# Patient Record
Sex: FEMALE | Race: WHITE | NOT HISPANIC OR LATINO | Employment: UNEMPLOYED | URBAN - NONMETROPOLITAN AREA
[De-identification: names, ages, dates, MRNs, and addresses within clinical notes are randomized per-mention and may not be internally consistent; named-entity substitution may affect disease eponyms.]

---

## 2017-04-01 ENCOUNTER — APPOINTMENT (OUTPATIENT)
Dept: GENERAL RADIOLOGY | Facility: HOSPITAL | Age: 27
End: 2017-04-01

## 2017-04-01 ENCOUNTER — APPOINTMENT (OUTPATIENT)
Dept: CT IMAGING | Facility: HOSPITAL | Age: 27
End: 2017-04-01

## 2017-04-01 ENCOUNTER — HOSPITAL ENCOUNTER (EMERGENCY)
Facility: HOSPITAL | Age: 27
Discharge: HOME | End: 2017-04-01
Attending: EMERGENCY MEDICINE | Admitting: EMERGENCY MEDICINE

## 2017-04-01 VITALS
BODY MASS INDEX: 45.99 KG/M2 | WEIGHT: 293 LBS | HEIGHT: 67 IN | DIASTOLIC BLOOD PRESSURE: 84 MMHG | HEART RATE: 88 BPM | SYSTOLIC BLOOD PRESSURE: 121 MMHG | OXYGEN SATURATION: 97 % | RESPIRATION RATE: 16 BRPM | TEMPERATURE: 98.4 F

## 2017-04-01 DIAGNOSIS — D72.829 LEUKOCYTOSIS, UNSPECIFIED TYPE: ICD-10-CM

## 2017-04-01 DIAGNOSIS — S40.012A CONTUSION OF LEFT SHOULDER, INITIAL ENCOUNTER: ICD-10-CM

## 2017-04-01 DIAGNOSIS — S06.0X9A CONCUSSION, WITH LOSS OF CONSCIOUSNESS OF UNSPECIFIED DURATION, INITIAL ENCOUNTER: ICD-10-CM
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
## 2017-04-01 LAB
ALBUMIN SERPL-MCNC: 3.7 G/DL (ref 3.4–4.8)
ALBUMIN/GLOB SERPL: 1.2 G/DL (ref 1.1–1.8)
ALP SERPL-CCNC: 63 U/L (ref 38–126)
ALT SERPL W P-5'-P-CCNC: 24 U/L (ref 9–52)
AMYLASE SERPL-CCNC: 57 U/L (ref 50–130)
ANION GAP SERPL CALCULATED.3IONS-SCNC: 11 MMOL/L (ref 5–15)
AST SERPL-CCNC: 21 U/L (ref 14–36)
BASOPHILS # BLD AUTO: 0.03 10*3/MM3 (ref 0–0.2)
BASOPHILS NFR BLD AUTO: 0.2 % (ref 0–2)
BILIRUB SERPL-MCNC: 0.5 MG/DL (ref 0.2–1.3)
BILIRUB UR QL STRIP: NEGATIVE
BUN BLD-MCNC: 7 MG/DL (ref 7–21)
BUN/CREAT SERPL: 9.7 (ref 7–25)
CALCIUM SPEC-SCNC: 8.8 MG/DL (ref 8.4–10.2)
CHLORIDE SERPL-SCNC: 101 MMOL/L (ref 95–110)
CLARITY UR: CLEAR
CO2 SERPL-SCNC: 26 MMOL/L (ref 22–31)
COLOR UR: YELLOW
CREAT BLD-MCNC: 0.72 MG/DL (ref 0.5–1)
DEPRECATED RDW RBC AUTO: 44.2 FL (ref 36.4–46.3)
EOSINOPHIL # BLD AUTO: 0.48 10*3/MM3 (ref 0–0.7)
EOSINOPHIL NFR BLD AUTO: 2.9 % (ref 0–7)
ERYTHROCYTE [DISTWIDTH] IN BLOOD BY AUTOMATED COUNT: 13.5 % (ref 11.5–14.5)
GFR SERPL CREATININE-BSD FRML MDRD: 96 ML/MIN/1.73 (ref 60–165)
GLOBULIN UR ELPH-MCNC: 3.2 GM/DL (ref 2.3–3.5)
GLUCOSE BLD-MCNC: 123 MG/DL (ref 60–100)
GLUCOSE UR STRIP-MCNC: NEGATIVE MG/DL
HCG SERPL QL: NEGATIVE
HCT VFR BLD AUTO: 39.6 % (ref 35–45)
HGB BLD-MCNC: 14.7 G/DL (ref 12–15.5)
HGB UR QL STRIP.AUTO: NEGATIVE
IMM GRANULOCYTES # BLD: 0.07 10*3/MM3 (ref 0–0.02)
IMM GRANULOCYTES NFR BLD: 0.4 % (ref 0–0.5)
KETONES UR QL STRIP: NEGATIVE
LEUKOCYTE ESTERASE UR QL STRIP.AUTO: NEGATIVE
LIPASE SERPL-CCNC: 42 U/L (ref 23–300)
LYMPHOCYTES # BLD AUTO: 3.77 10*3/MM3 (ref 0.6–4.2)
LYMPHOCYTES NFR BLD AUTO: 22.9 % (ref 10–50)
MCH RBC QN AUTO: 33.5 PG (ref 26.5–34)
MCHC RBC AUTO-ENTMCNC: ABNORMAL G/DL (ref 31.4–36)
MCV RBC AUTO: 90 FL (ref 80–98)
MONOCYTES # BLD AUTO: 0.76 10*3/MM3 (ref 0–0.9)
MONOCYTES NFR BLD AUTO: 4.6 % (ref 0–12)
NEUTROPHILS # BLD AUTO: 11.32 10*3/MM3 (ref 2–8.6)
NEUTROPHILS NFR BLD AUTO: 69 % (ref 37–80)
NITRITE UR QL STRIP: NEGATIVE
NRBC BLD MANUAL-RTO: 0 /100 WBC (ref 0–0)
PH UR STRIP.AUTO: 6.5 [PH] (ref 5–9)
PLATELET # BLD AUTO: 287 10*3/MM3 (ref 150–450)
PMV BLD AUTO: 8.7 FL (ref 8–12)
POTASSIUM BLD-SCNC: 3.8 MMOL/L (ref 3.5–5.1)
PROT SERPL-MCNC: 6.9 G/DL (ref 6.3–8.6)
PROT UR QL STRIP: NEGATIVE
RBC # BLD AUTO: 4.4 10*6/MM3 (ref 3.77–5.16)
SODIUM BLD-SCNC: 138 MMOL/L (ref 137–145)
SP GR UR STRIP: 1.01 (ref 1–1.03)
UROBILINOGEN UR QL STRIP: NORMAL
WBC NRBC COR # BLD: 16.43 10*3/MM3 (ref 3.2–9.8)
WHOLE BLOOD HOLD SPECIMEN: NORMAL

## 2017-04-01 PROCEDURE — 85025 COMPLETE CBC W/AUTO DIFF WBC: CPT | Performed by: EMERGENCY MEDICINE

## 2017-04-01 PROCEDURE — 73090 X-RAY EXAM OF FOREARM: CPT

## 2017-04-01 PROCEDURE — 84703 CHORIONIC GONADOTROPIN ASSAY: CPT | Performed by: EMERGENCY MEDICINE

## 2017-04-01 PROCEDURE — 82150 ASSAY OF AMYLASE: CPT | Performed by: EMERGENCY MEDICINE

## 2017-04-01 PROCEDURE — 25010000002 KETOROLAC TROMETHAMINE PER 15 MG: Performed by: EMERGENCY MEDICINE

## 2017-04-01 PROCEDURE — 83690 ASSAY OF LIPASE: CPT | Performed by: EMERGENCY MEDICINE

## 2017-04-01 PROCEDURE — 81003 URINALYSIS AUTO W/O SCOPE: CPT | Performed by: EMERGENCY MEDICINE

## 2017-04-01 PROCEDURE — 80053 COMPREHEN METABOLIC PANEL: CPT | Performed by: EMERGENCY MEDICINE

## 2017-04-01 PROCEDURE — 96372 THER/PROPH/DIAG INJ SC/IM: CPT

## 2017-04-01 PROCEDURE — 73030 X-RAY EXAM OF SHOULDER: CPT

## 2017-04-01 PROCEDURE — 99284 EMERGENCY DEPT VISIT MOD MDM: CPT

## 2017-04-01 PROCEDURE — 70450 CT HEAD/BRAIN W/O DYE: CPT

## 2017-04-01 PROCEDURE — 71020 HC CHEST PA AND LATERAL: CPT

## 2017-04-01 PROCEDURE — 72125 CT NECK SPINE W/O DYE: CPT

## 2017-04-01 RX ORDER — KETOROLAC TROMETHAMINE 30 MG/ML
60 INJECTION, SOLUTION INTRAMUSCULAR; INTRAVENOUS ONCE
Status: COMPLETED | OUTPATIENT
Start: 2017-04-01 | End: 2017-04-01

## 2017-04-01 RX ORDER — HYDROCODONE BITARTRATE AND ACETAMINOPHEN 7.5; 325 MG/1; MG/1
1 TABLET ORAL ONCE
Status: COMPLETED | OUTPATIENT
Start: 2017-04-01 | End: 2017-04-01

## 2017-04-01 RX ORDER — SODIUM CHLORIDE 0.9 % (FLUSH) 0.9 %
10 SYRINGE (ML) INJECTION AS NEEDED
Status: DISCONTINUED | OUTPATIENT
Start: 2017-04-01 | End: 2017-04-01 | Stop reason: HOSPADM

## 2017-04-01 RX ADMIN — HYDROCODONE BITARTRATE AND ACETAMINOPHEN 1 TABLET: 7.5; 325 TABLET ORAL at 15:45

## 2017-04-01 RX ADMIN — KETOROLAC TROMETHAMINE 60 MG: 60 INJECTION, SOLUTION INTRAMUSCULAR at 19:02
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
## 2017-04-01 NOTE — DISCHARGE INSTRUCTIONS
Return ED fever, chest pain, shortness of air, abdominal pain, vomiting, worse condition, other concerns

## 2017-04-05 ENCOUNTER — OFFICE VISIT (OUTPATIENT)
Dept: FAMILY MEDICINE CLINIC | Facility: CLINIC | Age: 27
End: 2017-04-05

## 2017-04-05 VITALS
HEIGHT: 68 IN | SYSTOLIC BLOOD PRESSURE: 110 MMHG | WEIGHT: 293 LBS | BODY MASS INDEX: 44.41 KG/M2 | DIASTOLIC BLOOD PRESSURE: 72 MMHG

## 2017-04-05 DIAGNOSIS — M75.82 ROTATOR CUFF TENDONITIS, LEFT: ICD-10-CM

## 2017-04-05 DIAGNOSIS — R41.0 CONFUSION: ICD-10-CM

## 2017-04-05 DIAGNOSIS — R51.9 HEADACHE, UNSPECIFIED HEADACHE TYPE: Primary | ICD-10-CM

## 2017-04-05 PROCEDURE — 99213 OFFICE O/P EST LOW 20 MIN: CPT | Performed by: FAMILY MEDICINE

## 2017-04-05 RX ORDER — LORAZEPAM 1 MG/1
1 TABLET ORAL ONCE
Qty: 1 TABLET | Refills: 0 | Status: SHIPPED | OUTPATIENT
Start: 2017-04-05 | End: 2017-04-05

## 2017-04-05 RX ORDER — DICLOFENAC POTASSIUM 50 MG/1
50 TABLET, FILM COATED ORAL 3 TIMES DAILY
Qty: 60 TABLET | Refills: 2 | Status: SHIPPED | OUTPATIENT
Start: 2017-04-05 | End: 2017-11-01

## 2017-04-05 RX ORDER — DESOGESTREL AND ETHINYL ESTRADIOL 0.15-0.03
KIT ORAL
Refills: 2 | COMMUNITY
Start: 2017-02-27 | End: 2017-04-05

## 2017-04-05 NOTE — PATIENT INSTRUCTIONS
Post-Concussion Syndrome  Post-concussion syndrome describes the symptoms that can occur after a head injury. These symptoms can last from weeks to months.  CAUSES   It is not clear why some head injuries cause post-concussion syndrome. It can occur whether your head injury was mild or severe and whether you were wearing head protection or not.   SIGNS AND SYMPTOMS  · Memory difficulties.  · Dizziness.  · Headaches.  · Double vision or blurry vision.  · Sensitivity to light.  · Hearing difficulties.  · Depression.  · Tiredness.  · Weakness.  · Difficulty with concentration.  · Difficulty sleeping or staying asleep.  · Vomiting.  · Poor balance or instability on your feet.  · Slow reaction time.  · Difficulty learning and remembering things you have heard.  DIAGNOSIS   There is no test to determine whether you have post-concussion syndrome. Your health care provider may order an imaging scan of your brain, such as a CT scan, to check for other problems that may be causing your symptoms (such as a severe injury inside your skull).  TREATMENT   Usually, these problems disappear over time without medical care. Your health care provider may prescribe medicine to help ease your symptoms. It is important to follow up with a neurologist to evaluate your recovery and address any lingering symptoms or issues.  HOME CARE INSTRUCTIONS   · Take medicines only as directed by your health care provider. Do not take aspirin. Aspirin can slow blood clotting.  · Sleep with your head slightly elevated to help with headaches.  · Avoid any situation where there is potential for another head injury. This includes football, hockey, soccer, basketball, martial arts, downhill snow sports, and horseback riding. Your condition will get worse every time you experience a concussion. You should avoid these activities until you are evaluated by the appropriate follow-up health care providers.  · Keep all follow-up visits as directed by your health  care provider. This is important.  SEEK MEDICAL CARE IF:  · You have increased problems paying attention or concentrating.  · You have increased difficulty remembering or learning new information.  · You need more time to complete tasks or assignments than before.  · You have increased irritability or decreased ability to cope with stress.  · You have more symptoms than before.  Seek medical care if you have any of the following symptoms for more than two weeks after your injury:  · Lasting (chronic) headaches.  · Dizziness or balance problems.  · Nausea.  · Vision problems.  · Increased sensitivity to noise or light.  · Depression or mood swings.  · Anxiety or irritability.  · Memory problems.  · Difficulty concentrating or paying attention.  · Sleep problems.  · Feeling tired all the time.  SEEK IMMEDIATE MEDICAL CARE IF:  · You have confusion or unusual drowsiness.  · Others find it difficult to wake you up.  · You have nausea or persistent, forceful vomiting.  · You feel like you are moving when you are not (vertigo). Your eyes may move rapidly back and forth.  · You have convulsions or faint.  · You have severe, persistent headaches that are not relieved by medicine.  · You cannot use your arms or legs normally.  · One of your pupils is larger than the other.  · You have clear or bloody discharge from your nose or ears.  · Your problems are getting worse, not better.  MAKE SURE YOU:  · Understand these instructions.  · Will watch your condition.  · Will get help right away if you are not doing well or get worse.     This information is not intended to replace advice given to you by your health care provider. Make sure you discuss any questions you have with your health care provider.     Document Released: 06/09/2003 Document Revised: 01/08/2016 Document Reviewed: 03/25/2015  [employer] Patient Education ©2016 [employer]    Surgery for Rotator Cuff Tear With Rehab  Rotator cuff surgery is only  recommended for individuals who have experienced persistent disability for greater than 3 months of non-surgical (conservative) treatment. Surgery is not necessary but is recommended for individuals who experience difficulty completing daily activities or athletes who are unable to compete. Rotator cuff tears do not usually heal without surgical intervention. If left alone small rotator cuff tears usually become larger. Younger athletes who have a rotator cuff tear may be recommended for surgery without attempting conservative rehabilitation. The purpose of surgery is to regain function of the shoulder joint and eliminate pain associated with the injury. In addition to repairing the tendon tear, the surgery often removes a portion of the bony roof of the shoulder (acromion) as well as the chronically thickened and inflamed membrane below the acromion (subacromial bursa).  REASONS NOT TO OPERATE   · Infection of the shoulder.  · Inability to complete a rehabilitation program.  · Patients who have other conditions (emotional or psychological) conditions that contribute to their shoulder condition.  RISKS AND COMPLICATIONS  · Infection.  · Re-tear of the rotator cuff tendons or muscles.  · Shoulder stiffness and/or weakness.  · Inability to compete in athletics.  · Acromioclavicular (AC) joint paint.  · Risks of surgery: infection, bleeding, nerve damage, or damage to surrounding tissues.  TECHNIQUE  There are different surgical procedures used to treat rotator cuff tears. The type of procedure depends on the extent of injury as well as the surgeon's preference. All of the surgical techniques for rotator cuff tears have the same goal of repairing the torn tendon, removing part of the acromion, and removing the subacromial bursa. There are two main types of procedures: arthroscopic and open incision.  Arthroscopic procedures are usually completed and you go home the same day as surgery (outpatient). These procedures use  multiple small incisions in which tools and a video camera are placed to work on the shoulder. An electric shaver removes the bursa, then a power kana shaves down the portion of the acromion that places pressure on the rotator cuff. Finally the rotator cuff is sewed (sutured) back to the humeral head.  Open incision procedures require a larger incision. The deltoid muscle is detached from the acromion and a ligament in the shoulder (coracoacromial) is cut in order for the surgeon to access the rotator cuff. The subacromial bursa is removed as well as part of the acromion to give the rotator cuff room to move freely. The torn tendon is then sutured to the humeral head. After the rotator cuff is repaired, then the deltoid is reattached and the incision is closed up.   RECOVERY   · Post-operative care depends on the surgical technique and the preferences of your therapist.  · Keep the wound clean and dry for the first 10 to 14 days after surgery.  · Keep your shoulder and arm in the sling provided to you for as long as you have been instructed to.  · You will be given pain medications by your caregiver.  · Passive (without using muscles) shoulder movements may be begun immediately after surgery.  · It is important to follow through with you rehabilitation program in order to have the best possible recovery.  RETURN TO SPORTS   · The rehabilitation period will depend on the sport and position you play as well as the success of the operation.  · The minimum recovery period is 6 months.  · You must have regained complete shoulder motion and strength before returning to sports.  SEEK IMMEDIATE MEDICAL CARE IF:   · Any medications produce adverse side effects.  · Any complications from surgery occur:    Pain, numbness, or coldness in the extremity operated upon.    Discoloration of the nail beds (they become blue or gray) of the extremity operated upon.    Signs of infections (fever, pain, inflammation, redness, or  persistent bleeding).  EXERCISES   RANGE OF MOTION (ROM) AND STRETCHING EXERCISES - Rotator Cuff Tear, Surgery For  These exercises may help you restore your elbow mobility once your physician has discontinued your immobilization period. Beginning these before your provider's approval may result in delayed healing. Your symptoms may resolve with or without further involvement from your physician, physical therapist or . While completing these exercises, remember:   · Restoring tissue flexibility helps normal motion to return to the joints. This allows healthier, less painful movement and activity.  · An effective stretch should be held for at least 30 seconds. A stretch should never be painful. You should only feel a gentle lengthening or release in the stretch.  ROM - Pendulum   · Bend at the waist so that your right / left arm falls away from your body. Support yourself with your opposite hand on a solid surface, such as a table or a countertop.  · Your right / left arm should be perpendicular to the ground. If it is not perpendicular, you need to lean over farther. Relax the muscles in your right / left arm and shoulder as much as possible.  · Gently sway your hips and trunk so they move your right / left arm without any use of your right / left shoulder muscles.  · Progress your movements so that your right / left arm moves side to side, then forward and backward, and finally, both clockwise and counterclockwise.  · Complete __________ repetitions in each direction. Many people use this exercise to relieve discomfort in their shoulder as well as to gain range of motion.  Repeat __________ times. Complete this exercise __________ times per day.  STRETCH - Flexion, Seated   · Sit in a firm chair so that your right / left forearm can rest on a table or on a table or countertop. Your right / left elbow should rest below the height of your shoulder so that your shoulder feels supported and not tense  or uncomfortable.  · Keeping your right / left shoulder relaxed, lean forward at your waist, allowing your right / left hand to slide forward. Bend forward until you feel a moderate stretch in your shoulder, but before you feel an increase in your pain.  · Hold __________ seconds. Slowly return to your starting position.  Repeat __________ times. Complete this exercise __________ times per day.   STRETCH - Flexion, Standing   · Stand with good posture. With an underhand  on your right / left and an overhand  on the opposite hand, grasp a broomstick or cane so that your hands are a little more than shoulder-width apart.  · Keeping your right / left elbow straight and shoulder muscles relaxed, push the stick with your opposite hand to raise your right / left arm in front of your body and then overhead. Raise your arm until you feel a stretch in your right / left shoulder, but before you have increased shoulder pain.  · Try to avoid shrugging your right / left shoulder as your arm rises by keeping your shoulder blade tucked down and toward your mid-back spine. Hold __________ seconds.  · Slowly return to the starting position.  Repeat __________ times. Complete this exercise __________ times per day.   STRETCH - Abduction, Supine   · Stand with good posture. With an underhand  on your right / left and an overhand  on the opposite hand, grasp a broomstick or cane so that your hands are a little more than shoulder-width apart.  · Keeping your right / left elbow straight and shoulder muscles relaxed, push the stick with your opposite hand to raise your right / left arm out to the side of your body and then overhead. Raise your arm until you feel a stretch in your right / left shoulder, but before you have increased shoulder pain.  · Try to avoid shrugging your right / left shoulder as your arm rises by keeping your shoulder blade tucked down and toward your mid-back spine. Hold __________  "seconds.  · Slowly return to the starting position.  Repeat __________ times. Complete this exercise __________ times per day.   ROM - Flexion, Active-Assisted  · Lie on your back. You may bend your knees for comfort.  · Grasp a broomstick or cane so your hands are about shoulder-width apart. Your right / left hand should  the end of the stick/cane so that your hand is positioned \"thumbs-up,\" as if you were about to shake hands.  · Using your healthy arm to lead, raise your right / left arm overhead until you feel a gentle stretch in your shoulder. Hold __________ seconds.  · Use the stick/cane to assist in returning your right / left arm to its starting position.  Repeat __________ times. Complete this exercise __________ times per day.   STRETCH - External Rotation   · Tuck a folded towel or small ball under your right / left upper arm. Grasp a broomstick or cane with an underhand grasp a little more than shoulder width apart. Bend your elbows to 90 degrees.  · Stand with good posture or sit in a chair without arms.  · Use your strong arm to push the stick across your body. Do not allow the towel or ball to fall. This will rotate your right / left arm away from your abdomen. Using the stick turn/rotate your hand and forearm away from your body. Hold __________ seconds.  Repeat __________ times. Complete this exercise __________ times per day.   STRENGTHENING EXERCISES - Rotator Cuff Tear, Surgery For  These exercises may help you begin to restore your elbow strength in the initial stage of your rehabilitation. Your physician will determine when you begin these exercises depending on the severity of your injury and the integrity of your repaired tissues. Beginning these before your provider's approval may result in delayed healing. While completing these exercises, remember:   · Muscles can gain both the endurance and the strength needed for everyday activities through controlled exercises.  · Complete these " exercises as instructed by your physician, physical therapist or . Progress the resistance and repetitions only as guided.  · You may experience muscle soreness or fatigue, but the pain or discomfort you are trying to eliminate should never worsen during these exercises. If this pain does worsen, stop and make certain you are following the directions exactly. If the pain is still present after adjustments, discontinue the exercise until you can discuss the trouble with your clinician.  STRENGTH - Shoulder Flexion, Isometric   · With good posture and facing a wall, stand or sit about 4-6 inches away.  · Keeping your right / left elbow straight, gently press the top of your fist into the wall. Increase the pressure gradually until you are pressing as hard as you can without shrugging your shoulder or increasing any shoulder discomfort.  · Hold __________ seconds. Release the tension slowly. Relax your shoulder muscles completely before you do the next repetition.  Repeat __________ times. Complete this exercise __________ times per day.   STRENGTH - Shoulder Abductors, Isometric   · With good posture, stand or sit about 4-6 inches from a wall with your right / left side facing the wall.  · Bend your right / left elbow. Gently press your right / left elbow into the wall. Increase the pressure gradually until you are pressing as hard as you can without shrugging your shoulder or increasing any shoulder discomfort.  · Hold __________ seconds.  · Release the tension slowly. Relax your shoulder muscles completely before you do the next repetition.  Repeat __________ times. Complete this exercise __________ times per day.   STRENGTH - Internal Rotators, Isometric   · Keep your right / left elbow at your side and bend it 90 degrees.  · Step into a door frame so that the inside of your right / left wrist can press against the door frame without your upper arm leaving your side.  · Gently press your right /  left wrist into the door frame as if you were trying to draw the palm of your hand to your abdomen. Gradually increase the tension until you are pressing as hard as you can without shrugging your shoulder or increasing any shoulder discomfort.  · Hold __________ seconds.  · Release the tension slowly. Relax your shoulder muscles completely before you do the next repetition.  Repeat __________ times. Complete this exercise __________ times per day.   STRENGTH - External Rotators, Isometric   · Keep your right / left elbow at your side and bend it 90 degrees.  · Step into a door frame so that the outside of your right / left wrist can press against the door frame without your upper arm leaving your side.  · Gently press your right / left wrist into the door frame as if you were trying to swing the back of your hand away from your abdomen. Gradually increase the tension until you are pressing as hard as you can without shrugging your shoulder or increasing any shoulder discomfort.  · Hold __________ seconds.  · Release the tension slowly. Relax your shoulder muscles completely before you do the next repetition.  Repeat __________ times. Complete this exercise __________ times per day.      This information is not intended to replace advice given to you by your health care provider. Make sure you discuss any questions you have with your health care provider.     Document Released: 12/18/2006 Document Revised: 05/03/2016 Document Reviewed: 12/31/2016  [employer] Interactive Patient Education ©2016 [employer]
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
## 2017-04-07 ENCOUNTER — TELEPHONE (OUTPATIENT)
Dept: FAMILY MEDICINE CLINIC | Facility: CLINIC | Age: 27
End: 2017-04-07
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
## 2017-04-12 ENCOUNTER — APPOINTMENT (OUTPATIENT)
Dept: MRI IMAGING | Facility: HOSPITAL | Age: 27
End: 2017-04-12

## 2017-04-13 ENCOUNTER — HOSPITAL ENCOUNTER (OUTPATIENT)
Dept: PHYSICAL THERAPY | Facility: HOSPITAL | Age: 27
Setting detail: THERAPIES SERIES
End: 2017-04-13

## 2017-12-05 ENCOUNTER — TELEPHONE (OUTPATIENT)
Dept: FAMILY MEDICINE CLINIC | Facility: CLINIC | Age: 27
End: 2017-12-05

## 2018-04-13 ENCOUNTER — OFFICE VISIT (OUTPATIENT)
Dept: FAMILY MEDICINE CLINIC | Facility: CLINIC | Age: 28
End: 2018-04-13

## 2018-04-13 ENCOUNTER — HOSPITAL ENCOUNTER (OUTPATIENT)
Dept: GENERAL RADIOLOGY | Facility: HOSPITAL | Age: 28
Discharge: HOME | End: 2018-04-13
Admitting: FAMILY MEDICINE

## 2018-04-13 VITALS
TEMPERATURE: 98.5 F | RESPIRATION RATE: 17 BRPM | HEIGHT: 68 IN | DIASTOLIC BLOOD PRESSURE: 84 MMHG | SYSTOLIC BLOOD PRESSURE: 131 MMHG | HEART RATE: 85 BPM | WEIGHT: 280 LBS | BODY MASS INDEX: 42.44 KG/M2

## 2018-04-13 VITALS
BODY MASS INDEX: 44.41 KG/M2 | DIASTOLIC BLOOD PRESSURE: 78 MMHG | HEIGHT: 68 IN | WEIGHT: 293 LBS | SYSTOLIC BLOOD PRESSURE: 130 MMHG

## 2018-04-13 DIAGNOSIS — G93.2 INTRACRANIAL HYPERTENSION, BENIGN: ICD-10-CM

## 2018-04-13 DIAGNOSIS — G93.2 INTRACRANIAL HYPERTENSION, BENIGN: Primary | ICD-10-CM

## 2018-04-13 LAB
B-HCG UR QL: NEGATIVE
INTERNAL NEGATIVE CONTROL: NEGATIVE
INTERNAL POSITIVE CONTROL: POSITIVE
Lab: NORMAL

## 2018-04-13 PROCEDURE — 77003 FLUOROGUIDE FOR SPINE INJECT: CPT

## 2018-04-13 PROCEDURE — 99214 OFFICE O/P EST MOD 30 MIN: CPT | Performed by: FAMILY MEDICINE

## 2018-04-13 RX ORDER — LORAZEPAM 1 MG/1
1 TABLET ORAL
Qty: 1 TABLET | Refills: 0 | Status: SHIPPED | OUTPATIENT
Start: 2018-04-13 | End: 2018-04-13

## 2018-04-13 RX ORDER — IBUPROFEN 800 MG/1
800 TABLET ORAL EVERY 6 HOURS PRN
Qty: 120 TABLET | Refills: 2 | Status: SHIPPED | OUTPATIENT
Start: 2018-04-13

## 2018-04-13 RX ORDER — ONDANSETRON 4 MG/1
4 TABLET, FILM COATED ORAL EVERY 8 HOURS PRN
COMMUNITY

## 2018-04-13 NOTE — PROGRESS NOTES
Subjective   Cyrus Saxena is a 28 y.o. female.     Headache    This is a recurrent problem. The current episode started more than 1 year ago. The problem occurs daily. The problem has been rapidly worsening. The pain is located in the left unilateral, frontal and parietal region. The pain does not radiate. The pain quality is similar to prior headaches. The quality of the pain is described as throbbing. The pain is severe. Associated symptoms include blurred vision, nausea and phonophobia. Pertinent negatives include no abdominal pain, abnormal behavior, anorexia, back pain, coughing, dizziness, drainage, ear pain, eye pain, eye redness, eye watering, facial sweating, fever, hearing loss, insomnia, loss of balance, muscle aches, neck pain, numbness, photophobia, rhinorrhea, scalp tenderness, seizures, sinus pressure, sore throat, swollen glands, tingling, tinnitus, visual change, vomiting, weakness or weight loss. Nothing aggravates the symptoms. She has tried NSAIDs for the symptoms. The treatment provided mild relief. Her past medical history is significant for obesity and pseudotumor cerebri.        No current outpatient prescriptions on file.    The following portions of the patient's history were reviewed and updated as appropriate: allergies, current medications, past family history, past medical history, past social history, past surgical history and problem list.    Review of Systems   Constitutional: Negative for fever and weight loss.   HENT: Negative for ear pain, hearing loss, rhinorrhea, sinus pressure, sore throat and tinnitus.    Eyes: Positive for blurred vision. Negative for photophobia, pain and redness.   Respiratory: Negative for cough.    Gastrointestinal: Positive for nausea. Negative for abdominal pain, anorexia and vomiting.   Musculoskeletal: Negative for back pain and neck pain.   Neurological: Positive for headaches. Negative for dizziness, tingling, seizures, weakness, numbness and loss  "of balance.   Psychiatric/Behavioral: The patient does not have insomnia.        Objective    Vitals:    04/13/18 0826   BP: 130/78   Weight: (!) 143 kg (314 lb 6.4 oz)   Height: 172.7 cm (68\")       Physical Exam   Constitutional: She is oriented to person, place, and time. She appears well-developed and well-nourished. She appears distressed.   Eyes: EOM are normal. Pupils are equal, round, and reactive to light.   Cardiovascular: Normal rate, regular rhythm and normal heart sounds.    No murmur heard.  No LE edema.   Pulmonary/Chest: Effort normal and breath sounds normal. No respiratory distress.   Abdominal: Soft. Bowel sounds are normal. She exhibits no distension. There is no tenderness.   Neurological: She is alert and oriented to person, place, and time. No cranial nerve deficit.   Psychiatric: She has a normal mood and affect. Her behavior is normal. Judgment and thought content normal.   Nursing note and vitals reviewed.      Assessment/Plan   Problems Addressed this Visit        Cardiovascular and Mediastinum    Intracranial hypertension, benign - Primary    Relevant Orders    Ambulatory Referral to Neurology    IR Lumbar Puncture Diagnosis      Other Visit Diagnoses    None.     Patient has had issues with headaches in the past and only better with LP. Called radiology and they will be able to do therapeurtic LP with IR today.  Stat labs ordered.  Also referring to Neurologyy.  RTC in 1-2 months or sooner PRN             "

## 2018-04-16 ENCOUNTER — TELEPHONE (OUTPATIENT)
Dept: FAMILY MEDICINE CLINIC | Facility: CLINIC | Age: 28
End: 2018-04-16

## 2018-04-16 RX ORDER — LIDOCAINE 50 MG/G
1 PATCH TOPICAL EVERY 24 HOURS
Qty: 30 EACH | Refills: 1 | Status: SHIPPED | OUTPATIENT
Start: 2018-04-16 | End: 2018-05-01

## 2018-04-16 NOTE — TELEPHONE ENCOUNTER
Called Ms. Saxena back to discuss her pain and she had an LP and she has been having a lot of issues with low back pain.  She went to the urgent care and they didn't think that it was from her job and not the LP. She has been having issues with her back hurting for awhile and now has been worse.  She had a shot of toradol. And she has been taking ibuprofen.    She says that she has just forgotten to mention that it was bothering her.    She needs to make another appointment for evaluation.    While talking to patient was disconnected and then she didn't answer when trying to call back.

## 2018-04-19 ENCOUNTER — OFFICE VISIT (OUTPATIENT)
Dept: FAMILY MEDICINE CLINIC | Facility: CLINIC | Age: 28
End: 2018-04-19

## 2018-04-19 VITALS
DIASTOLIC BLOOD PRESSURE: 78 MMHG | SYSTOLIC BLOOD PRESSURE: 106 MMHG | BODY MASS INDEX: 44.41 KG/M2 | WEIGHT: 293 LBS | HEIGHT: 68 IN

## 2018-04-19 DIAGNOSIS — S39.012A STRAIN OF LUMBAR REGION, INITIAL ENCOUNTER: Primary | ICD-10-CM

## 2018-04-19 DIAGNOSIS — G93.2 INTRACRANIAL HYPERTENSION, BENIGN: ICD-10-CM

## 2018-04-19 PROCEDURE — 99213 OFFICE O/P EST LOW 20 MIN: CPT | Performed by: FAMILY MEDICINE

## 2018-04-19 NOTE — PROGRESS NOTES
Subjective   Cyrus Saxena is a 28 y.o. female.     Back Pain   This is a recurrent problem. The problem occurs intermittently. The problem has been gradually improving since onset. The pain is present in the lumbar spine. The quality of the pain is described as stabbing. Radiates to: Right gluteal region. The pain is moderate. The pain is the same all the time. The symptoms are aggravated by bending and twisting. Associated symptoms include leg pain and tingling. Pertinent negatives include no abdominal pain, bladder incontinence, bowel incontinence, chest pain, dysuria, fever, headaches, numbness, paresis, paresthesias, pelvic pain, perianal numbness, weakness or weight loss. Risk factors: She had recent LP. She has tried NSAIDs for the symptoms. The treatment provided mild relief.   She had LP last week to help with her headache.  She has been a little better since then. She has been deysi ibuprofen and that seems to be working better since LP.  She hasn't heard back from neurology referral.      Current Outpatient Prescriptions:   •  ibuprofen (ADVIL,MOTRIN) 800 MG tablet, Take 1 tablet by mouth Every 6 (Six) Hours As Needed for Mild Pain  or Moderate Pain ., Disp: 120 tablet, Rfl: 2  •  lidocaine (LIDODERM) 5 %, Place 1 patch on the skin Daily. Remove & Discard patch within 12 hours or as directed by MD, Disp: 30 each, Rfl: 1  •  ondansetron (ZOFRAN) 4 MG tablet, Take 4 mg by mouth Every 8 (Eight) Hours As Needed., Disp: , Rfl:     The following portions of the patient's history were reviewed and updated as appropriate: allergies, current medications, past family history, past medical history, past social history, past surgical history and problem list.    Review of Systems   Constitutional: Negative for fever and weight loss.   Cardiovascular: Negative for chest pain.   Gastrointestinal: Negative for abdominal pain and bowel incontinence.   Genitourinary: Negative for bladder incontinence, dysuria and pelvic  "pain.   Musculoskeletal: Positive for back pain.   Neurological: Positive for tingling. Negative for weakness, numbness, headaches and paresthesias.       Objective    Vitals:    04/19/18 1428   BP: 106/78   Weight: (!) 142 kg (314 lb)   Height: 172.7 cm (68\")       Physical Exam   Constitutional: She is oriented to person, place, and time. She appears well-developed and well-nourished. No distress.   Cardiovascular: Normal rate, regular rhythm and normal heart sounds.    No murmur heard.  No LE edema.   Pulmonary/Chest: Effort normal and breath sounds normal. No respiratory distress.   Abdominal: Soft. Bowel sounds are normal. She exhibits no distension. There is no tenderness.   Musculoskeletal:        Lumbar back: She exhibits decreased range of motion, tenderness, pain and spasm. She exhibits no deformity.   Neurological: She is alert and oriented to person, place, and time.   Psychiatric: She has a normal mood and affect. Her behavior is normal. Judgment and thought content normal.   Nursing note and vitals reviewed.      Assessment/Plan   Problems Addressed this Visit        Cardiovascular and Mediastinum    Intracranial hypertension, benign      Other Visit Diagnoses     Strain of lumbar region, initial encounter    -  Primary        1.) lumbar strain-  Will try stretches, NSAIDS, and low dose muscle relaxers.  Needs to work on weight. That would also help back issues in the future.  2.) Headaches-  Needs to get in with neurology.  Referral is still pending.  Ibuprofen PRN and avoid triggers.  RTC in 2 months or sooner PRN           "

## 2018-05-08 ENCOUNTER — OFFICE VISIT (OUTPATIENT)
Dept: FAMILY MEDICINE CLINIC | Facility: CLINIC | Age: 28
End: 2018-05-08

## 2018-05-08 VITALS
SYSTOLIC BLOOD PRESSURE: 106 MMHG | HEIGHT: 68 IN | DIASTOLIC BLOOD PRESSURE: 78 MMHG | WEIGHT: 293 LBS | BODY MASS INDEX: 44.41 KG/M2

## 2018-05-08 DIAGNOSIS — F31.0 BIPOLAR AFFECTIVE DISORDER, CURRENT EPISODE HYPOMANIC (HCC): Primary | ICD-10-CM

## 2018-05-08 PROCEDURE — 99213 OFFICE O/P EST LOW 20 MIN: CPT | Performed by: FAMILY MEDICINE

## 2018-05-08 RX ORDER — QUETIAPINE FUMARATE 50 MG/1
50 TABLET, FILM COATED ORAL NIGHTLY
Qty: 30 TABLET | Refills: 2 | Status: SHIPPED | OUTPATIENT
Start: 2018-05-08 | End: 2018-05-18 | Stop reason: HOSPADM

## 2018-05-08 RX ORDER — LAMOTRIGINE 25 MG/1
25 TABLET ORAL DAILY
Qty: 30 TABLET | Refills: 2 | Status: SHIPPED | OUTPATIENT
Start: 2018-05-08

## 2018-05-08 NOTE — PROGRESS NOTES
"Subjective   Cyrus Saxena is a 28 y.o. female.     History of Present Illness   Ms. Saxena is a 27yo female with bipolar disorder that presents for issues with her moods. She says that in September is when she started to feel overwhelmed. She had been trying to deal with her moods without medications.   She has been on medication in the past and never has liked taking them long-term, but she feels that things are bad enough that she needs them now.      Current Outpatient Prescriptions:   •  ibuprofen (ADVIL,MOTRIN) 800 MG tablet, Take 1 tablet by mouth Every 6 (Six) Hours As Needed for Mild Pain  or Moderate Pain ., Disp: 120 tablet, Rfl: 2  •  ondansetron (ZOFRAN) 4 MG tablet, Take 4 mg by mouth Every 8 (Eight) Hours As Needed., Disp: , Rfl:     The following portions of the patient's history were reviewed and updated as appropriate: allergies, current medications, past family history, past medical history, past social history, past surgical history and problem list.    Review of Systems   Constitutional: Positive for activity change, appetite change and fatigue.   Cardiovascular: Negative for chest pain and leg swelling.   Musculoskeletal: Positive for arthralgias and back pain.   Neurological: Positive for headaches.   Psychiatric/Behavioral: Positive for agitation, decreased concentration and dysphoric mood. The patient is nervous/anxious.        Objective    Vitals:    05/08/18 1252   BP: 106/78   Weight: (!) 139 kg (307 lb)   Height: 172.7 cm (68\")       Physical Exam   Constitutional: She is oriented to person, place, and time. She appears well-developed and well-nourished. She appears distressed.   Tearful and crying   Cardiovascular: Normal rate, regular rhythm and normal heart sounds.    No murmur heard.  No LE edema.   Pulmonary/Chest: Effort normal and breath sounds normal. No respiratory distress.   Abdominal: Soft. Bowel sounds are normal. She exhibits no distension. There is no tenderness. "   Neurological: She is alert and oriented to person, place, and time.   Psychiatric: Her behavior is normal. Judgment and thought content normal.   Flat affect and depressed mood.   Nursing note and vitals reviewed.      Assessment/Plan   Problems Addressed this Visit        Other    Bipolar affective disorder, current episode hypomanic - Primary    Relevant Medications    QUEtiapine (SEROQUEL) 50 MG tablet      Other Visit Diagnoses    None.       Will restart seroquel at night and then lamictal in the daytime. Encouraged her to get out of the house and meet people. She has been staying home alone and feeling very lonely.  RTC in 1 month or sooner PRN

## 2018-05-15 ENCOUNTER — APPOINTMENT (OUTPATIENT)
Dept: GENERAL RADIOLOGY | Facility: HOSPITAL | Age: 28
End: 2018-05-15

## 2018-05-15 ENCOUNTER — HOSPITAL ENCOUNTER (OUTPATIENT)
Facility: HOSPITAL | Age: 28
Setting detail: OBSERVATION
End: 2018-05-16
Attending: STUDENT IN AN ORGANIZED HEALTH CARE EDUCATION/TRAINING PROGRAM | Admitting: INTERNAL MEDICINE

## 2018-05-15 DIAGNOSIS — G93.40 ENCEPHALOPATHY: ICD-10-CM

## 2018-05-15 DIAGNOSIS — T14.91XA SUICIDAL BEHAVIOR WITH ATTEMPTED SELF-INJURY (HCC): Primary | ICD-10-CM

## 2018-05-15 DIAGNOSIS — T43.202A ANTIDEPRESSANT OVERDOSE, INTENTIONAL SELF-HARM, INITIAL ENCOUNTER (HCC): ICD-10-CM

## 2018-05-15 LAB
ALBUMIN SERPL-MCNC: 4 G/DL (ref 3.4–4.8)
ALBUMIN/GLOB SERPL: 1 G/DL (ref 1.1–1.8)
ALP SERPL-CCNC: 81 U/L (ref 38–126)
ALT SERPL W P-5'-P-CCNC: 37 U/L (ref 9–52)
AMPHET+METHAMPHET UR QL: NEGATIVE
ANION GAP SERPL CALCULATED.3IONS-SCNC: 13 MMOL/L (ref 5–15)
ANISOCYTOSIS BLD QL: ABNORMAL
APAP SERPL-MCNC: <10 MCG/ML (ref 10–30)
AST SERPL-CCNC: 20 U/L (ref 14–36)
BACTERIA UR QL AUTO: ABNORMAL /HPF
BARBITURATES UR QL SCN: NEGATIVE
BENZODIAZ UR QL SCN: NEGATIVE
BILIRUB SERPL-MCNC: 0.7 MG/DL (ref 0.2–1.3)
BILIRUB UR QL STRIP: ABNORMAL
BUN BLD-MCNC: 11 MG/DL (ref 7–21)
BUN/CREAT SERPL: 13.6 (ref 7–25)
CALCIUM SPEC-SCNC: 9 MG/DL (ref 8.4–10.2)
CANNABINOIDS SERPL QL: NEGATIVE
CHLORIDE SERPL-SCNC: 101 MMOL/L (ref 95–110)
CK SERPL-CCNC: 43 U/L (ref 30–135)
CLARITY UR: ABNORMAL
CO2 SERPL-SCNC: 21 MMOL/L (ref 22–31)
COCAINE UR QL: POSITIVE
COLOR UR: ABNORMAL
CREAT BLD-MCNC: 0.81 MG/DL (ref 0.5–1)
DEPRECATED RDW RBC AUTO: 44.9 FL (ref 36.4–46.3)
EOSINOPHIL # BLD MANUAL: 0.14 10*3/MM3 (ref 0–0.7)
EOSINOPHIL NFR BLD MANUAL: 1 % (ref 0–7)
ERYTHROCYTE [DISTWIDTH] IN BLOOD BY AUTOMATED COUNT: 13.5 % (ref 11.5–14.5)
ETHANOL BLD-MCNC: <10 MG/DL (ref 0–10)
ETHANOL UR QL: <0.01 %
GFR SERPL CREATININE-BSD FRML MDRD: 83 ML/MIN/1.73 (ref 64–149)
GLOBULIN UR ELPH-MCNC: 3.9 GM/DL (ref 2.3–3.5)
GLUCOSE BLD-MCNC: 154 MG/DL (ref 60–100)
GLUCOSE UR STRIP-MCNC: NEGATIVE MG/DL
HCG SERPL QL: NEGATIVE
HCT VFR BLD AUTO: 45.9 % (ref 35–45)
HGB BLD-MCNC: 17.4 G/DL (ref 12–15.5)
HGB UR QL STRIP.AUTO: NEGATIVE
HYALINE CASTS UR QL AUTO: ABNORMAL /LPF
KETONES UR QL STRIP: ABNORMAL
LEUKOCYTE ESTERASE UR QL STRIP.AUTO: ABNORMAL
LIPASE SERPL-CCNC: 75 U/L (ref 23–300)
LYMPHOCYTES # BLD MANUAL: 4.83 10*3/MM3 (ref 0.6–4.2)
LYMPHOCYTES NFR BLD MANUAL: 34 % (ref 10–50)
LYMPHOCYTES NFR BLD MANUAL: 9 % (ref 0–12)
MCH RBC QN AUTO: 34.5 PG (ref 26.5–34)
MCHC RBC AUTO-ENTMCNC: 37.9 G/DL (ref 31.4–36)
MCV RBC AUTO: 91.1 FL (ref 80–98)
METHADONE UR QL SCN: NEGATIVE
MONOCYTES # BLD AUTO: 1.28 10*3/MM3 (ref 0–0.9)
NEUTROPHILS # BLD AUTO: 7.95 10*3/MM3 (ref 2–8.6)
NEUTROPHILS NFR BLD MANUAL: 56 % (ref 37–80)
NITRITE UR QL STRIP: NEGATIVE
OPIATES UR QL: NEGATIVE
OXYCODONE UR QL SCN: NEGATIVE
PH UR STRIP.AUTO: 6 [PH] (ref 5–9)
PLATELET # BLD AUTO: 236 10*3/MM3 (ref 150–450)
PMV BLD AUTO: 9.2 FL (ref 8–12)
POTASSIUM BLD-SCNC: 3.6 MMOL/L (ref 3.5–5.1)
PROT SERPL-MCNC: 7.9 G/DL (ref 6.3–8.6)
PROT UR QL STRIP: ABNORMAL
RBC # BLD AUTO: 5.04 10*6/MM3 (ref 3.77–5.16)
RBC # UR: ABNORMAL /HPF
REF LAB TEST METHOD: ABNORMAL
SALICYLATES SERPL-MCNC: <1 MG/DL (ref 10–20)
SMALL PLATELETS BLD QL SMEAR: ADEQUATE
SODIUM BLD-SCNC: 135 MMOL/L (ref 137–145)
SP GR UR STRIP: 1.03 (ref 1–1.03)
SQUAMOUS #/AREA URNS HPF: ABNORMAL /HPF
UROBILINOGEN UR QL STRIP: ABNORMAL
WBC MORPH BLD: NORMAL
WBC NRBC COR # BLD: 14.2 10*3/MM3 (ref 3.2–9.8)
WBC UR QL AUTO: ABNORMAL /HPF

## 2018-05-15 PROCEDURE — 80307 DRUG TEST PRSMV CHEM ANLYZR: CPT | Performed by: STUDENT IN AN ORGANIZED HEALTH CARE EDUCATION/TRAINING PROGRAM

## 2018-05-15 PROCEDURE — 80053 COMPREHEN METABOLIC PANEL: CPT | Performed by: STUDENT IN AN ORGANIZED HEALTH CARE EDUCATION/TRAINING PROGRAM

## 2018-05-15 PROCEDURE — 99285 EMERGENCY DEPT VISIT HI MDM: CPT

## 2018-05-15 PROCEDURE — 81001 URINALYSIS AUTO W/SCOPE: CPT | Performed by: STUDENT IN AN ORGANIZED HEALTH CARE EDUCATION/TRAINING PROGRAM

## 2018-05-15 PROCEDURE — 82550 ASSAY OF CK (CPK): CPT | Performed by: STUDENT IN AN ORGANIZED HEALTH CARE EDUCATION/TRAINING PROGRAM

## 2018-05-15 PROCEDURE — 84703 CHORIONIC GONADOTROPIN ASSAY: CPT | Performed by: STUDENT IN AN ORGANIZED HEALTH CARE EDUCATION/TRAINING PROGRAM

## 2018-05-15 PROCEDURE — 83690 ASSAY OF LIPASE: CPT | Performed by: STUDENT IN AN ORGANIZED HEALTH CARE EDUCATION/TRAINING PROGRAM

## 2018-05-15 PROCEDURE — 85025 COMPLETE CBC W/AUTO DIFF WBC: CPT | Performed by: STUDENT IN AN ORGANIZED HEALTH CARE EDUCATION/TRAINING PROGRAM

## 2018-05-15 PROCEDURE — 71045 X-RAY EXAM CHEST 1 VIEW: CPT

## 2018-05-15 PROCEDURE — 93010 ELECTROCARDIOGRAM REPORT: CPT | Performed by: INTERNAL MEDICINE

## 2018-05-15 PROCEDURE — 85007 BL SMEAR W/DIFF WBC COUNT: CPT | Performed by: STUDENT IN AN ORGANIZED HEALTH CARE EDUCATION/TRAINING PROGRAM

## 2018-05-15 PROCEDURE — 93005 ELECTROCARDIOGRAM TRACING: CPT | Performed by: INTERNAL MEDICINE

## 2018-05-15 RX ORDER — SODIUM CHLORIDE 9 MG/ML
INJECTION, SOLUTION INTRAVENOUS
Status: DISCONTINUED
Start: 2018-05-15 | End: 2018-05-16 | Stop reason: HOSPADM

## 2018-05-15 RX ORDER — SODIUM CHLORIDE 0.9 % (FLUSH) 0.9 %
10 SYRINGE (ML) INJECTION AS NEEDED
Status: DISCONTINUED | OUTPATIENT
Start: 2018-05-15 | End: 2018-05-16 | Stop reason: HOSPADM

## 2018-05-15 RX ADMIN — SODIUM CHLORIDE 1000 ML: 9 INJECTION, SOLUTION INTRAVENOUS at 18:54

## 2018-05-15 NOTE — ED PROVIDER NOTES
Subjective   History of Present Illness  Ms. Saxena is a 29 y/o female with no significant PMHx presents to the emergency department with overdose.  Patient is a poor historian she is slightly altered.  Per police patient was [place] the [employer] by family due to suicidal thoughts and purchasing a unregistered gun [place] this past week.  Patient plans to kill herself.  While at the [employer] family noticed patient was acting strange.  Patient admitted to taking a handful of Seroquel, presumably 20 tablets.  She also admitted to taking Robaxin, unknown quantity.  Patient also endorses she did cocaine last night.  She denied any distal ingestions.  She states she did this to try to kill herself. Patient herself denies having any pain. Denies cutting herself. States I'm sleepy.    Review of Systems   Constitutional: Positive for activity change.   Respiratory: Negative for shortness of breath.    Cardiovascular: Negative for chest pain.   Gastrointestinal: Negative for abdominal pain and vomiting.   Musculoskeletal: Negative for back pain.   Skin: Negative for rash and wound.   Neurological: Negative for headaches.   Psychiatric/Behavioral: Positive for behavioral problems and suicidal ideas. Negative for self-injury.   ROS limited due to AMS.    Past Medical History:   Diagnosis Date   • Encounter for screening for malignant neoplasm of cervix    • Epidermoid cyst    • Headache    • Headache    • Heartburn    • Heartburn    • Idiopathic intracranial hypertension     unable to assess optic nerve function without vf   • Insomnia    • Insomnia    • Intracranial hypertension, benign    • Papilledema    • Papilledema associated with increased intracranial pressure        No Known Allergies    Past Surgical History:   Procedure Laterality Date   • CERVICAL CONIZATION, LEEP      LEEP CONE   •  SECTION      x2   • CHOLECYSTECTOMY     • HYSTEROSCOPY  2013    Exam under anesthesia,  "diagnostic hysteroscopy, fractional dilation and curettage, and NovaSure endometrial ablation   • INJECTION OF MEDICATION  05/25/2016    Benadryl (1)   • INJECTION OF MEDICATION  04/28/2016    Kenalog (1)   • INJECTION OF MEDICATION  05/25/2016    Toradol (1)   • TONSILLECTOMY     • TUBAL ABDOMINAL LIGATION         Family History   Problem Relation Age of Onset   • Cervical cancer Mother    • Hypertension Maternal Grandmother    • Osteoarthritis Maternal Grandmother    • Cancer Other      colorectal cancer   • Ovarian cancer Other        Social History     Social History   • Marital status:      Social History Main Topics   • Smoking status: Current Every Day Smoker     Packs/day: 0.50     Types: Cigarettes   • Smokeless tobacco: Never Used   • Alcohol use No   • Drug use:      Types: Cocaine   • Sexual activity: Defer     Other Topics Concern   • Not on file     Objective    /55   Pulse 98   Temp 97.9 °F (36.6 °C) (Oral)   Resp 16   Ht 170.2 cm (67\")   Wt (!) 139 kg (307 lb)   SpO2 96%   BMI 48.08 kg/m²   Physical Exam   Constitutional: She appears well-developed and well-nourished.   HENT:   Head: Normocephalic and atraumatic.   Eyes: EOM are normal. Pupils are equal, round, and reactive to light.   Neck: Normal range of motion. Neck supple.   Cardiovascular: Normal rate and normal heart sounds.    Tachycardia   Pulmonary/Chest: Effort normal and breath sounds normal. No respiratory distress.   Abdominal: Soft. There is no tenderness.   Obese   Musculoskeletal: Normal range of motion.   Neurological:   Alert to place and name   Skin: Skin is warm.   Psychiatric:   Lethargic and somnolent, SI   Nursing note and vitals reviewed.    Medical Decision Making:  Patient presents with suicidal gesture and overdose of seroquel and robaxin and current thoughts of suicide. Will place on legal hold and consult psychiatry for evaluation and placement. Patient is tachycardic but otherwise hemodynamically " stable. Doubt sympathomemtic toxidrome. Will check ECG for dysrhythmia. Will get toxicology screen, Etoh level, UDS, CBC, and BMP    Procedures       Labs Reviewed   COMPREHENSIVE METABOLIC PANEL - Abnormal; Notable for the following:        Result Value    Glucose 154 (*)     Sodium 135 (*)     CO2 21.0 (*)     Globulin 3.9 (*)     A/G Ratio 1.0 (*)     All other components within normal limits   URINALYSIS W/ MICROSCOPIC IF INDICATED - Abnormal; Notable for the following:     Appearance, UA Turbid (*)     Specific Gravity, UA 1.034 (*)     Ketones, UA Trace (*)     Bilirubin, UA Small (1+) (*)     Protein,  mg/dL (2+) (*)     Leuk Esterase, UA Trace (*)     All other components within normal limits   ACETAMINOPHEN LEVEL - Abnormal; Notable for the following:     Acetaminophen <10.0 (*)     All other components within normal limits   URINE DRUG SCREEN - Abnormal; Notable for the following:     Cocaine Screen, Urine Positive (*)     All other components within normal limits    Narrative:     Negative Thresholds For Drugs Screened in Urine:     Amphetamines          500 ng/ml  Barbiturates          200 ng/ml  Benzodiazepines       200 ng/ml  Cocaine               150 ng/ml  Methadone             300 ng/mL  Opiates               300 ng/mL  Oxycodone             100 ng/mL  THC                   20 ng/mL    The normal value for all drugs tested is negative. This report includes final unconfirmed screening results.  A positive result by this assay can be, at your request, sent to the Reference Lab for confirmation by gas chromatography. Unconfirmed results must not be used for non-medical purposes, such as employment or legal testing. Clinical consideration should be applied to any drug of abuse test result, particularly when unconfirmed results are used.   SALICYLATE LEVEL - Abnormal; Notable for the following:     Salicylate <1.0 (*)     All other components within normal limits   CBC WITH AUTO DIFFERENTIAL -  Abnormal; Notable for the following:     WBC 14.20 (*)     Hemoglobin 17.4 (*)     Hematocrit 45.9 (*)     MCH 34.5 (*)     MCHC 37.9 (*)     All other components within normal limits   URINALYSIS, MICROSCOPIC ONLY - Abnormal; Notable for the following:     Bacteria, UA Trace (*)     Squamous Epithelial Cells, UA 3-5 (*)     All other components within normal limits   MANUAL DIFFERENTIAL - Abnormal; Notable for the following:     Lymphocytes Absolute 4.83 (*)     Monocytes Absolute 1.28 (*)     All other components within normal limits   LIPASE - Normal   HCG, SERUM, QUALITATIVE - Normal   CK - Normal   ETHANOL   CBC AND DIFFERENTIAL    Narrative:     The following orders were created for panel order CBC & Differential.  Procedure                               Abnormality         Status                     ---------                               -----------         ------                     Manual Differential[[id number]]          Abnormal            Final result               Scan Slide[[id number]]                                                                  CBC Auto Differential[[id number]]        Abnormal            Final result                 Please view results for these tests on the individual orders.     Xr Chest 1 View    Result Date: 5/15/2018  Narrative: EXAM:         Radiograph(s), Chest VIEWS:   Portable ; 1     DATE/TIME:  5/15/2018 7:22 PM CDT            INDICATION:   Tachy, OD  COMPARISON:  CXR: 4/1/17         FINDINGS:         - lines/tubes:    none   - cardiac:         size within normal limits       - mediastinum: contour within normal limits       - lungs:         no focal air space process, pulmonary interstitial edema, nodule(s)/mass           - pleura:         no evidence of  fluid                - osseous:         unremarkable for age                - misc.:        Impression: CONCLUSION:    1. No evidence of an active cardiopulmonary process.                                  Electronically  signed by:  RANDAL Chang MD  5/15/2018 7:23 PM CDT Workstation: [id number]    ED Course  ED Course      2001:Tachycardia improving, tox screen and UDS are unremarkable.  CBC and CMP unremarkable.  Patient more alert however still somnolent.  We'll continue to reassess..  Blood pressure stable.    2151: Patient still more alert, heart rate approximately 110.  Blood pressure stable.  We'll continue to reassess with likely admission.     2157:  Patient more alert now, tachycardia nearly resolved.  Patient tolerated by mouth intake and voided urine.    2332: Spoke with Dr. Coffman who will admit to the hospitalist service. No additional recommendations. Will continue to reassess while in the ED.          MDM    Final diagnoses:   Suicidal behavior with attempted self-injury   Encephalopathy   Antidepressant overdose, intentional self-harm, initial encounter            Humble Hart MD  05/15/18 0421

## 2018-05-16 ENCOUNTER — HOSPITAL ENCOUNTER (INPATIENT)
Facility: HOSPITAL | Age: 28
LOS: 2 days | Discharge: HOME | End: 2018-05-18
Attending: PSYCHIATRY & NEUROLOGY | Admitting: PSYCHIATRY & NEUROLOGY

## 2018-05-16 VITALS
DIASTOLIC BLOOD PRESSURE: 75 MMHG | RESPIRATION RATE: 16 BRPM | OXYGEN SATURATION: 97 % | BODY MASS INDEX: 44.41 KG/M2 | SYSTOLIC BLOOD PRESSURE: 126 MMHG | HEART RATE: 93 BPM | HEIGHT: 68 IN | TEMPERATURE: 98.1 F | WEIGHT: 293 LBS

## 2018-05-16 LAB
ANION GAP SERPL CALCULATED.3IONS-SCNC: 6 MMOL/L (ref 5–15)
BASOPHILS # BLD AUTO: 0.02 10*3/MM3 (ref 0–0.2)
BASOPHILS NFR BLD AUTO: 0.2 % (ref 0–2)
BILIRUB UR QL STRIP: NEGATIVE
BUN BLD-MCNC: 10 MG/DL (ref 7–21)
BUN/CREAT SERPL: 15.4 (ref 7–25)
CALCIUM SPEC-SCNC: 8.5 MG/DL (ref 8.4–10.2)
CHLORIDE SERPL-SCNC: 108 MMOL/L (ref 95–110)
CLARITY UR: CLEAR
CO2 SERPL-SCNC: 25 MMOL/L (ref 22–31)
COLOR UR: YELLOW
CREAT BLD-MCNC: 0.65 MG/DL (ref 0.5–1)
DEPRECATED RDW RBC AUTO: 44 FL (ref 36.4–46.3)
EOSINOPHIL # BLD AUTO: 0.18 10*3/MM3 (ref 0–0.7)
EOSINOPHIL NFR BLD AUTO: 1.6 % (ref 0–7)
ERYTHROCYTE [DISTWIDTH] IN BLOOD BY AUTOMATED COUNT: 13.5 % (ref 11.5–14.5)
GFR SERPL CREATININE-BSD FRML MDRD: 107 ML/MIN/1.73 (ref 64–149)
GLUCOSE BLD-MCNC: 93 MG/DL (ref 60–100)
GLUCOSE UR STRIP-MCNC: NEGATIVE MG/DL
HCT VFR BLD AUTO: 38.4 % (ref 35–45)
HGB BLD-MCNC: 14.2 G/DL (ref 12–15.5)
HGB UR QL STRIP.AUTO: NEGATIVE
IMM GRANULOCYTES # BLD: 0.03 10*3/MM3 (ref 0–0.02)
IMM GRANULOCYTES NFR BLD: 0.3 % (ref 0–0.5)
KETONES UR QL STRIP: NEGATIVE
LEUKOCYTE ESTERASE UR QL STRIP.AUTO: NEGATIVE
LYMPHOCYTES # BLD AUTO: 3.57 10*3/MM3 (ref 0.6–4.2)
LYMPHOCYTES NFR BLD AUTO: 31.3 % (ref 10–50)
MCH RBC QN AUTO: 33.6 PG (ref 26.5–34)
MCHC RBC AUTO-ENTMCNC: 37 G/DL (ref 31.4–36)
MCV RBC AUTO: 90.8 FL (ref 80–98)
MONOCYTES # BLD AUTO: 0.92 10*3/MM3 (ref 0–0.9)
MONOCYTES NFR BLD AUTO: 8.1 % (ref 0–12)
NEUTROPHILS # BLD AUTO: 6.69 10*3/MM3 (ref 2–8.6)
NEUTROPHILS NFR BLD AUTO: 58.5 % (ref 37–80)
NITRITE UR QL STRIP: NEGATIVE
PH UR STRIP.AUTO: 6 [PH] (ref 5–9)
PLATELET # BLD AUTO: 211 10*3/MM3 (ref 150–450)
PMV BLD AUTO: 9.5 FL (ref 8–12)
POTASSIUM BLD-SCNC: 3.9 MMOL/L (ref 3.5–5.1)
PROT UR QL STRIP: NEGATIVE
RBC # BLD AUTO: 4.23 10*6/MM3 (ref 3.77–5.16)
SODIUM BLD-SCNC: 139 MMOL/L (ref 137–145)
SP GR UR STRIP: 1.01 (ref 1–1.03)
UROBILINOGEN UR QL STRIP: NORMAL
WBC NRBC COR # BLD: 11.41 10*3/MM3 (ref 3.2–9.8)

## 2018-05-16 PROCEDURE — 99203 OFFICE O/P NEW LOW 30 MIN: CPT | Performed by: PSYCHIATRY & NEUROLOGY

## 2018-05-16 PROCEDURE — 94799 UNLISTED PULMONARY SVC/PX: CPT

## 2018-05-16 PROCEDURE — G0378 HOSPITAL OBSERVATION PER HR: HCPCS

## 2018-05-16 PROCEDURE — 96361 HYDRATE IV INFUSION ADD-ON: CPT

## 2018-05-16 PROCEDURE — 96360 HYDRATION IV INFUSION INIT: CPT

## 2018-05-16 PROCEDURE — 81003 URINALYSIS AUTO W/O SCOPE: CPT | Performed by: INTERNAL MEDICINE

## 2018-05-16 PROCEDURE — 80048 BASIC METABOLIC PNL TOTAL CA: CPT | Performed by: INTERNAL MEDICINE

## 2018-05-16 PROCEDURE — 94760 N-INVAS EAR/PLS OXIMETRY 1: CPT

## 2018-05-16 PROCEDURE — 85025 COMPLETE CBC W/AUTO DIFF WBC: CPT | Performed by: INTERNAL MEDICINE

## 2018-05-16 RX ORDER — SODIUM CHLORIDE 0.9 % (FLUSH) 0.9 %
1-10 SYRINGE (ML) INJECTION AS NEEDED
Status: DISCONTINUED | OUTPATIENT
Start: 2018-05-16 | End: 2018-05-16 | Stop reason: HOSPADM

## 2018-05-16 RX ORDER — NICOTINE 21 MG/24HR
1 PATCH, TRANSDERMAL 24 HOURS TRANSDERMAL EVERY 24 HOURS
Status: DISCONTINUED | OUTPATIENT
Start: 2018-05-16 | End: 2018-05-16

## 2018-05-16 RX ORDER — CLONIDINE HYDROCHLORIDE 0.1 MG/1
0.1 TABLET ORAL EVERY 4 HOURS PRN
Status: DISCONTINUED | OUTPATIENT
Start: 2018-05-16 | End: 2018-05-18 | Stop reason: HOSPADM

## 2018-05-16 RX ORDER — CLONIDINE HYDROCHLORIDE 0.1 MG/1
0.1 TABLET ORAL EVERY 4 HOURS PRN
Status: CANCELLED | OUTPATIENT
Start: 2018-05-16

## 2018-05-16 RX ORDER — LOPERAMIDE HYDROCHLORIDE 2 MG/1
2 CAPSULE ORAL 4 TIMES DAILY PRN
Status: DISCONTINUED | OUTPATIENT
Start: 2018-05-16 | End: 2018-05-18 | Stop reason: HOSPADM

## 2018-05-16 RX ORDER — LAMOTRIGINE 25 MG/1
25 TABLET ORAL DAILY
Status: DISCONTINUED | OUTPATIENT
Start: 2018-05-16 | End: 2018-05-16 | Stop reason: HOSPADM

## 2018-05-16 RX ORDER — ONDANSETRON 4 MG/1
4 TABLET, FILM COATED ORAL EVERY 6 HOURS PRN
Status: DISCONTINUED | OUTPATIENT
Start: 2018-05-16 | End: 2018-05-18 | Stop reason: HOSPADM

## 2018-05-16 RX ORDER — DOCUSATE SODIUM 100 MG/1
100 CAPSULE, LIQUID FILLED ORAL 2 TIMES DAILY PRN
Status: DISCONTINUED | OUTPATIENT
Start: 2018-05-16 | End: 2018-05-18 | Stop reason: HOSPADM

## 2018-05-16 RX ORDER — NICOTINE 21 MG/24HR
1 PATCH, TRANSDERMAL 24 HOURS TRANSDERMAL EVERY 24 HOURS
Status: CANCELLED | OUTPATIENT
Start: 2018-05-16

## 2018-05-16 RX ORDER — ONDANSETRON 4 MG/1
4 TABLET, FILM COATED ORAL EVERY 6 HOURS PRN
Status: CANCELLED | OUTPATIENT
Start: 2018-05-16

## 2018-05-16 RX ORDER — ACETAMINOPHEN 325 MG/1
650 TABLET ORAL EVERY 4 HOURS PRN
Status: CANCELLED | OUTPATIENT
Start: 2018-05-16

## 2018-05-16 RX ORDER — TRAZODONE HYDROCHLORIDE 50 MG/1
50 TABLET ORAL NIGHTLY PRN
Status: DISCONTINUED | OUTPATIENT
Start: 2018-05-16 | End: 2018-05-18 | Stop reason: HOSPADM

## 2018-05-16 RX ORDER — HYDROXYZINE PAMOATE 50 MG/1
50 CAPSULE ORAL EVERY 6 HOURS PRN
Status: CANCELLED | OUTPATIENT
Start: 2018-05-16

## 2018-05-16 RX ORDER — LOPERAMIDE HYDROCHLORIDE 2 MG/1
2 CAPSULE ORAL 4 TIMES DAILY PRN
Status: CANCELLED | OUTPATIENT
Start: 2018-05-16

## 2018-05-16 RX ORDER — DOCUSATE SODIUM 100 MG/1
100 CAPSULE, LIQUID FILLED ORAL 2 TIMES DAILY PRN
Status: CANCELLED | OUTPATIENT
Start: 2018-05-16

## 2018-05-16 RX ORDER — NICOTINE 21 MG/24HR
1 PATCH, TRANSDERMAL 24 HOURS TRANSDERMAL EVERY 24 HOURS
Start: 2018-05-16 | End: 2018-05-18 | Stop reason: HOSPADM

## 2018-05-16 RX ORDER — ONDANSETRON 2 MG/ML
4 INJECTION INTRAMUSCULAR; INTRAVENOUS EVERY 6 HOURS PRN
Status: DISCONTINUED | OUTPATIENT
Start: 2018-05-16 | End: 2018-05-16 | Stop reason: HOSPADM

## 2018-05-16 RX ORDER — NICOTINE 21 MG/24HR
1 PATCH, TRANSDERMAL 24 HOURS TRANSDERMAL EVERY 24 HOURS
Status: DISCONTINUED | OUTPATIENT
Start: 2018-05-17 | End: 2018-05-18 | Stop reason: HOSPADM

## 2018-05-16 RX ORDER — ACETAMINOPHEN 325 MG/1
650 TABLET ORAL EVERY 4 HOURS PRN
Status: DISCONTINUED | OUTPATIENT
Start: 2018-05-16 | End: 2018-05-18 | Stop reason: HOSPADM

## 2018-05-16 RX ORDER — HYDROXYZINE PAMOATE 50 MG/1
50 CAPSULE ORAL EVERY 6 HOURS PRN
Status: DISCONTINUED | OUTPATIENT
Start: 2018-05-16 | End: 2018-05-18 | Stop reason: HOSPADM

## 2018-05-16 RX ORDER — SODIUM CHLORIDE 9 MG/ML
100 INJECTION, SOLUTION INTRAVENOUS CONTINUOUS
Status: DISCONTINUED | OUTPATIENT
Start: 2018-05-16 | End: 2018-05-16 | Stop reason: HOSPADM

## 2018-05-16 RX ORDER — NICOTINE 21 MG/24HR
1 PATCH, TRANSDERMAL 24 HOURS TRANSDERMAL EVERY 24 HOURS
Status: DISCONTINUED | OUTPATIENT
Start: 2018-05-16 | End: 2018-05-16 | Stop reason: HOSPADM

## 2018-05-16 RX ORDER — CALCIUM CARBONATE 200(500)MG
1 TABLET,CHEWABLE ORAL 2 TIMES DAILY PRN
Status: DISCONTINUED | OUTPATIENT
Start: 2018-05-16 | End: 2018-05-16 | Stop reason: HOSPADM

## 2018-05-16 RX ORDER — TRAZODONE HYDROCHLORIDE 50 MG/1
50 TABLET ORAL NIGHTLY PRN
Status: CANCELLED | OUTPATIENT
Start: 2018-05-16

## 2018-05-16 RX ADMIN — SODIUM CHLORIDE 100 ML/HR: 9 INJECTION, SOLUTION INTRAVENOUS at 00:54

## 2018-05-16 RX ADMIN — NICOTINE 1 PATCH: 21 PATCH TRANSDERMAL at 16:57

## 2018-05-16 RX ADMIN — SODIUM CHLORIDE 100 ML/HR: 9 INJECTION, SOLUTION INTRAVENOUS at 12:08

## 2018-05-16 RX ADMIN — NICOTINE 1 PATCH: 14 PATCH, EXTENDED RELEASE TRANSDERMAL at 01:15

## 2018-05-16 RX ADMIN — TRAZODONE HYDROCHLORIDE 50 MG: 50 TABLET, FILM COATED ORAL at 20:31

## 2018-05-16 NOTE — PLAN OF CARE
Problem: Patient Care Overview  Goal: Plan of Care Review  Outcome: Ongoing (interventions implemented as appropriate)   05/16/18 0535   Coping/Psychosocial   Plan of Care Reviewed With patient   Plan of Care Review   Progress no change     Goal: Individualization and Mutuality  Outcome: Ongoing (interventions implemented as appropriate)    Goal: Discharge Needs Assessment  Outcome: Ongoing (interventions implemented as appropriate)    Goal: Interprofessional Rounds/Family Conf  Outcome: Ongoing (interventions implemented as appropriate)      Problem: Fall Risk (Adult)  Goal: Identify Related Risk Factors and Signs and Symptoms  Outcome: Ongoing (interventions implemented as appropriate)    Goal: Absence of Fall  Outcome: Ongoing (interventions implemented as appropriate)      Problem: Suicide Risk (Adult)  Goal: Identify Related Risk Factors and Signs and Symptoms  Outcome: Ongoing (interventions implemented as appropriate)    Goal: Strength-Based Wellness/Recovery  Outcome: Ongoing (interventions implemented as appropriate)    Goal: Physical Safety  Outcome: Ongoing (interventions implemented as appropriate)      Problem: Violence, Risk/Actual (Adult)  Goal: Identify Related Risk Factors and Signs and Symptoms  Outcome: Ongoing (interventions implemented as appropriate)    Goal: Impulse Control  Outcome: Ongoing (interventions implemented as appropriate)    Goal: Anger Control  Outcome: Ongoing (interventions implemented as appropriate)

## 2018-05-16 NOTE — NURSING NOTE
Patient arrived to U per staff & security via w/c. Patient calm & cooperative with good eye contact. Contraband & body audit completed. Unit rules/policies reviewed with patient verbalizing understanding. Questions answered. Oriented to room & unit. Voluntary consent for admission & philosophy & practice for restraint/seclusion use signed. Patient came to ED after buying a gun off the street and then took 20 seroquel & 10 to 15 robaxin. Admitted to 407 then admitted to U room 658. Patient denies SI, HI, AVH. Denies pain and depression. Expresses anxiety d/t being away from her child.

## 2018-05-16 NOTE — NURSING NOTE
"Behavior   Anxiety: Feeling anxious  Depression: denies  Pain  0  AVH   no  S/I   no  H/I   no    Affect   calm and pleasant    Note: Patient calm & cooperative. Maintains eye contact. Verbalizes desire to go home. Stated \" I had nightmares last night\". Encouraged patient to remove nicoderm patch at bedtime and replace in the morning & patient agreed. Patient was able to contact her ex to alert him she was ok.      Intervention  Instructed in medication usage and effects  Medications administered as ordered  Encouraged to verbalize needs      Response  Verbalized understanding   Did patient take medications as ordered n/a  Did patient interact with assessment?  yes     Plan  Will monitor for safety  Will monitor every 15 minutes as ordered  Will evaluate and promote the plan of care  "

## 2018-05-16 NOTE — NURSING NOTE
Dr Richards ROS         General  NONE    Eyes   glasses/contact lens    ENT/Mouth   None    Cardio   None    Resp   None    GI    None       None    MS    None    Skin/Hair/Nails   None    Neuro   None

## 2018-05-16 NOTE — PLAN OF CARE
Problem: Suicidal Behavior (Adult)  Goal: Suicidal Behavior is Absent/Minimized/Managed  Outcome: Ongoing (interventions implemented as appropriate)      Problem: Mood Impairment (Depressive Signs/Symptoms) (Adult)  Goal: Improved Mood Symptoms (Depressive Signs/Symptoms)  Outcome: Ongoing (interventions implemented as appropriate)

## 2018-05-16 NOTE — H&P
[facility] Medicine Services  INPATIENT HISTORY AND PHYSICAL       Patient Care Team:  Eufemia Frazier MD as PCP - General    Chief complaint   Chief Complaint   Patient presents with   • Suicidal   • Drug Overdose       Subjective     Patient is a 28 y.o. female with history of seizure disorder brought to the emergency room secondary to suicide attempt with drug overdose.  Per police patient was [place] the [place] by family due to suicidal thoughts and purchasing a unregistered gun [place] this past week.  Patient plans to kill herself.  While at the [employer] family noticed patient was acting strange.  Patient admitted to taking a handful of Seroquel, presumably 20 tablets and an unknown amount Robaxin.  Patient also endorses she did cocaine last night.  She denied any distal ingestions.  She states she did this to try to kill herself. Patient herself denies having any pain and denies cutting herself.  Her serum acetaminophen and salicylate levels were unremarkable, pregnancy test negative and CBC and CMP were unremarkable except for mild leukocytosis of 14,000.  She was commenced on IV hydration and referred for admission.    Patient is still quite drowsy and unwilling to give any additional history.  This dictation was done through a review of emergency room records.      Family and social history: I'm unable to obtain this information as patient is quite drowsy and unwilling to give any history.       Review of Systems   Review of Systems  Unable to review as patient is quite drowsy.  History  Past Medical History:   Diagnosis Date   • Encounter for screening for malignant neoplasm of cervix    • Epidermoid cyst    • Headache    • Headache    • Heartburn    • Heartburn    • Idiopathic intracranial hypertension     unable to assess optic nerve function without vf   • Insomnia    • Insomnia    • Intracranial hypertension, benign    •  Papilledema    • Papilledema associated with increased intracranial pressure      Past Surgical History:   Procedure Laterality Date   • CERVICAL CONIZATION, LEEP      LEEP CONE   •  SECTION      x2   • CHOLECYSTECTOMY     • HYSTEROSCOPY  2013    Exam under anesthesia, diagnostic hysteroscopy, fractional dilation and curettage, and NovaSure endometrial ablation   • INJECTION OF MEDICATION  2016    Benadryl (1)   • INJECTION OF MEDICATION  2016    Kenalog (1)   • INJECTION OF MEDICATION  2016    Toradol (1)   • TONSILLECTOMY     • TUBAL ABDOMINAL LIGATION       Family History   Problem Relation Age of Onset   • Cervical cancer Mother    • Hypertension Maternal Grandmother    • Osteoarthritis Maternal Grandmother    • Cancer Other      colorectal cancer   • Ovarian cancer Other      Social History   Substance Use Topics   • Smoking status: Current Every Day Smoker     Packs/day: 0.50     Types: Cigarettes   • Smokeless tobacco: Never Used   • Alcohol use No       (Not in a hospital admission)  Allergies:  Review of patient's allergies indicates no known allergies.  Prior to Admission medications    Medication Sig Start Date End Date Taking? Authorizing Provider   ibuprofen (ADVIL,MOTRIN) 800 MG tablet Take 1 tablet by mouth Every 6 (Six) Hours As Needed for Mild Pain  or Moderate Pain . 18   Eufemia Frazier MD   lamoTRIgine (LAMICTAL) 25 MG tablet Take 1 tablet by mouth Daily. 18   Eufemia Frazier MD   ondansetron (ZOFRAN) 4 MG tablet Take 4 mg by mouth Every 8 (Eight) Hours As Needed.    Historical Provider, MD   QUEtiapine (SEROQUEL) 50 MG tablet Take 1 tablet by mouth Every Night. 18   Eufemia Frazier MD       Objective     Vital Signs    Temp:  [97.9 °F (36.6 °C)] 97.9 °F (36.6 °C)  Heart Rate:  [] 98  Resp:  [16-18] 16  BP: ()/(53-58) 101/55    Physical Exam:      Physical Exam   Constitutional: She appears well-developed and  well-nourished. She is sleeping. She is easily aroused. No distress.   Patient is obese.   HENT:   Head: Normocephalic and atraumatic.   Right Ear: External ear normal.   Left Ear: External ear normal.   Nose: Nose normal.   Mouth/Throat: Oropharynx is clear and moist.   Eyes: Conjunctivae are normal. Pupils are equal, round, and reactive to light.   Neck: Normal range of motion. Neck supple. No JVD present. No thyromegaly present.   Cardiovascular: Normal rate, regular rhythm, normal heart sounds and intact distal pulses.  Exam reveals no gallop and no friction rub.    No murmur heard.  Pulmonary/Chest: Effort normal and breath sounds normal. No stridor. No respiratory distress. She has no wheezes. She has no rales. She exhibits no tenderness.   Abdominal: Soft. Bowel sounds are normal. She exhibits no distension and no mass. There is no tenderness. There is no rebound and no guarding. No hernia.   Musculoskeletal: She exhibits no edema, tenderness or deformity.   Neurological: She is easily aroused. She exhibits normal muscle tone.   Patient is sleepy but easily arousable and able to follow simple commands.   Skin: Skin is warm and dry. No rash noted. She is not diaphoretic. No erythema. No pallor.   Psychiatric: She has a normal mood and affect. Her behavior is normal.   Nursing note and vitals reviewed.      Results Review:       Results from last 7 days  Lab Units 05/15/18  1854   SODIUM mmol/L 135*   POTASSIUM mmol/L 3.6   CHLORIDE mmol/L 101   CO2 mmol/L 21.0*   BUN mg/dL 11   CREATININE mg/dL 0.81   GLUCOSE mg/dL 154*   CALCIUM mg/dL 9.0   BILIRUBIN mg/dL 0.7   ALK PHOS U/L 81   ALT (SGPT) U/L 37   AST (SGOT) U/L 20               Results from last 7 days  Lab Units 05/15/18  1854   WBC 10*3/mm3 14.20*   HEMOGLOBIN g/dL 17.4*   HEMATOCRIT % 45.9*   PLATELETS 10*3/mm3 236           Imaging Results (last 7 days)     Procedure Component Value Units Date/Time    XR Chest 1 View [[id number]] Collected:  05/15/18  1900     Updated:  05/15/18 1924    Narrative:         EXAM:         Radiograph(s), Chest   VIEWS:   Portable ; 1       DATE/TIME:  5/15/2018 7:22 PM CDT                INDICATION:   Tachy, OD    COMPARISON:  CXR: 4/1/17             FINDINGS:             - lines/tubes:    none     - cardiac:         size within normal limits         - mediastinum: contour within normal limits         - lungs:         no focal air space process, pulmonary  interstitial edema, nodule(s)/mass             - pleura:         no evidence of  fluid                  - osseous:         unremarkable for age                  - misc.:         Impression:       CONCLUSION:        1. No evidence of an active cardiopulmonary process.                                                Electronically signed by:  RANDAL Chang MD  5/15/2018 7:23  PM CDT Workstation: [id number]          Assessment/Plan     Active Problems:    Suicidal behavior with attempted self-injury  - admit for observation and hemodynamic monitoring.  Begin IV fluids and consult psychiatrist when patient is medically cleared.  Erythrocytosis could be due to hemoconcentration and will be monitored.  Provide a sitter  - Possible UTI: Await outcome of urine cultures.  Patient is asymptomatic otherwise.  - Begin GI and DVT prophylaxis.        I discussed the patients findings and my recommendations with patient.     Derrell Coffman MD  05/16/18  12:00 AM        Total Time Spent: 50 minutes    EMR Dragon/Transcription disclaimer:   Much of this encounter note is an electronic transcription/translation of spoken language to printed text. The electronic translation of spoken language may permit erroneous, or at times, nonsensical words or phrases to be inadvertently transcribed; Although I have reviewed the note for such errors, some may still exist.

## 2018-05-16 NOTE — ED NOTES
Patient moved from room 6 to room 14, continues to be 1 on 1 observation.  Patient able to wake up and stand, assist to the wheelchair.       Kaylene Hensley RN  05/15/18 2019

## 2018-05-16 NOTE — PROGRESS NOTES
[facility] Medicine Services  INPATIENT PROGRESS NOTE    Length of Stay: 0  Date of Admission: 5/15/2018  Primary Care Physician: Eufemia Frazier MD    Subjective   Chief Complaint: intentional overdose, depression  HPI:  28-year-old  female with past medical history of bipolar disorder and benign intracranial hypertension who presented on 5/15/2018 related to suicide attempt from drug overdose.  According to previous providers note, the patient was brought to the police department by her family due to suicidal thoughts and recent purchase of an unregistered gun in [place] over the past week.  The patient was noted to be acting strange while at the police department and admitted to family and authorities that she took approximately 20 circumflex Will and an unknown amount of Robaxin and an attempt to kill herself.  The patient also reported use of cocaine as well.  The patient has been observed overnight and given IV hydration and cardiac monitoring.  During today's visit, the patient is drowsy but arousable.  Plan is for psychiatry consultation when patient is awake and alert.    Review of Systems   Constitutional: Negative for fever.   Respiratory: Negative for cough, shortness of breath and wheezing.    Cardiovascular: Negative for chest pain and palpitations.   Gastrointestinal: Negative for abdominal pain, diarrhea, nausea and vomiting.   Skin: Negative for pallor.   Neurological: Positive for headaches. Negative for dizziness and weakness.   Psychiatric/Behavioral: Positive for dysphoric mood, self-injury and suicidal ideas. Negative for agitation, confusion and decreased concentration.        All pertinent negatives and positives are as above. All other systems have been reviewed and are negative unless otherwise stated.     Objective    Temp:  [97.6 °F (36.4 °C)-98 °F (36.7 °C)] 97.6 °F (36.4 °C)  Heart Rate:  [] 83  Resp:  [16-18] 16  BP:  ()/(53-76) 115/57    Physical Exam   Constitutional: She is oriented to person, place, and time. She appears well-developed and well-nourished. She appears lethargic. She is cooperative.   HENT:   Head: Normocephalic.   Eyes: Conjunctivae are normal.   Neck: Neck supple.   Cardiovascular: Normal rate, normal heart sounds and intact distal pulses.    Pulmonary/Chest: Effort normal and breath sounds normal.   Abdominal: Soft. Bowel sounds are normal.   Musculoskeletal: Normal range of motion.   Neurological: She is oriented to person, place, and time. She appears lethargic.   Skin: Skin is warm and dry.   Psychiatric: She has a normal mood and affect. Her behavior is normal.   Vitals reviewed.          Results Review:  I have reviewed the labs, radiology results, and diagnostic studies.    Laboratory Data:     Results from last 7 days  Lab Units 05/16/18  0529 05/15/18  1854   SODIUM mmol/L 139 135*   POTASSIUM mmol/L 3.9 3.6   CHLORIDE mmol/L 108 101   CO2 mmol/L 25.0 21.0*   BUN mg/dL 10 11   CREATININE mg/dL 0.65 0.81   GLUCOSE mg/dL 93 154*   CALCIUM mg/dL 8.5 9.0   BILIRUBIN mg/dL  --  0.7   ALK PHOS U/L  --  81   ALT (SGPT) U/L  --  37   AST (SGOT) U/L  --  20   ANION GAP mmol/L 6.0 13.0     Estimated Creatinine Clearance: 189.2 mL/min (by C-G formula based on SCr of 0.65 mg/dL).            Results from last 7 days  Lab Units 05/16/18  0529 05/15/18  1854   WBC 10*3/mm3 11.41* 14.20*   HEMOGLOBIN g/dL 14.2 17.4*   HEMATOCRIT % 38.4 45.9*   PLATELETS 10*3/mm3 211 236           Culture Data:   No results found for: BLOODCX  No results found for: URINECX  No results found for: RESPCX  No results found for: WOUNDCX  No results found for: STOOLCX  No components found for: BODYFLD    Radiology Data:   Imaging Results (last 24 hours)     Procedure Component Value Units Date/Time    XR Chest 1 View [[id number]] Collected:  05/15/18 1900     Updated:  05/15/18 1924    Narrative:         EXAM:         Radiograph(s),  Chest   VIEWS:   Portable ; 1       DATE/TIME:  5/15/2018 7:22 PM CDT                INDICATION:   Tachy, OD    COMPARISON:  CXR: 4/1/17             FINDINGS:             - lines/tubes:    none     - cardiac:         size within normal limits         - mediastinum: contour within normal limits         - lungs:         no focal air space process, pulmonary  interstitial edema, nodule(s)/mass             - pleura:         no evidence of  fluid                  - osseous:         unremarkable for age                  - misc.:         Impression:       CONCLUSION:        1. No evidence of an active cardiopulmonary process.                                                Electronically signed by:  RANDAL Chang MD  5/15/2018 7:23  PM CDT Workstation: [id number]          I have reviewed the patient current medications.     Assessment/Plan     Active Hospital Problems (** Indicates Principal Problem)    Diagnosis Date Noted   • Suicidal behavior with attempted self-injury [T14.91XA] 05/15/2018   • Bipolar affective disorder, current episode hypomanic [F31.0] 08/17/2016   • Intracranial hypertension, benign [G93.2]       Resolved Hospital Problems    Diagnosis Date Noted Date Resolved   No resolved problems to display.       Plan:   1. Suicide attempt/intentional overdose: patient reports taking at least 20 Seroquel tablets and unknown amount of Robaxin.  Continue cardiac monitoring and IV hydration.  Plan for psychiatry consult when awake and alert as vitals, labs, imaging stable.  2. Bipolar disorder  3. Benign intracranial hypertension    Home dose of Lamictal ord          This document has been electronically signed by MARICEL Quinn on May 16, 2018 1:18 PM        ered but Seroquel still held at this time due to drowsiness.       Discharge Planning: Discharge plan will depend on recommendations of psychiatry.

## 2018-05-16 NOTE — ED NOTES
Patient eating a sandwich; drowsy but no difficulty eating.  Speech clearer.       Kaylene Hensley RN  05/15/18 3202

## 2018-05-16 NOTE — DISCHARGE SUMMARY
[facility] Medicine Services  DISCHARGE SUMMARY       Date of Admission: 5/15/2018  Date of Discharge:  5/16/2018  Primary Care Physician: Eufemia Frazier MD    Presenting Problem/History of Present Illness:  Encephalopathy [G93.40]  Antidepressant overdose, intentional self-harm, initial encounter [T43.202A]  Suicidal behavior with attempted self-injury [T14.91XA]       Final Discharge Diagnoses:  Hospital Problem List     Intracranial hypertension, benign    Bipolar affective disorder, current episode hypomanic    Suicidal behavior with attempted self-injury          Consults:   Consults     Date and Time Order Name Status Description    5/16/2018 0039 Inpatient Psychiatrist Consult Completed           Procedures Performed:                 Pertinent Test Results:   Lab Results (last 24 hours)     Procedure Component Value Units Date/Time    Basic Metabolic Panel [[id number]]  (Normal) Collected:  05/16/18 0529    Specimen:  Blood Updated:  05/16/18 0603     Glucose 93 mg/dL      BUN 10 mg/dL      Creatinine 0.65 mg/dL      Sodium 139 mmol/L      Potassium 3.9 mmol/L      Chloride 108 mmol/L      CO2 25.0 mmol/L      Calcium 8.5 mg/dL      eGFR Non African Amer 107 mL/min/1.73      BUN/Creatinine Ratio 15.4     Anion Gap 6.0 mmol/L     CBC Auto Differential [[id number]]  (Abnormal) Collected:  05/16/18 0529    Specimen:  Blood Updated:  05/16/18 0550     WBC 11.41 (H) 10*3/mm3      RBC 4.23 10*6/mm3      Hemoglobin 14.2 g/dL      Hematocrit 38.4 %      MCV 90.8 fL      MCH 33.6 pg      MCHC 37.0 (H) g/dL      RDW 13.5 %      RDW-SD 44.0 fl      MPV 9.5 fL      Platelets 211 10*3/mm3      Neutrophil % 58.5 %      Lymphocyte % 31.3 %      Monocyte % 8.1 %      Eosinophil % 1.6 %      Basophil % 0.2 %      Immature Grans % 0.3 %      Neutrophils, Absolute 6.69 10*3/mm3      Lymphocytes, Absolute 3.57 10*3/mm3      Monocytes, Absolute 0.92 (H) 10*3/mm3      Eosinophils,  Absolute 0.18 10*3/mm3      Basophils, Absolute 0.02 10*3/mm3      Immature Grans, Absolute 0.03 (H) 10*3/mm3     Urinalysis With / Culture If Indicated - Urine, Clean Catch [[id number]]  (Normal) Collected:  05/16/18 0359    Specimen:  Urine from Urine, Clean Catch Updated:  05/16/18 0410     Color, UA Yellow     Appearance, UA Clear     pH, UA 6.0     Specific Gravity, UA 1.011     Glucose, UA Negative     Ketones, UA Negative     Bilirubin, UA Negative     Blood, UA Negative     Protein, UA Negative     Leuk Esterase, UA Negative     Nitrite, UA Negative     Urobilinogen, UA 0.2 E.U./dL    Narrative:       Urine microscopic not indicated.    CBC & Differential [[id number]] Collected:  05/15/18 1854    Specimen:  Blood Updated:  05/15/18 2051    Narrative:       The following orders were created for panel order CBC & Differential.  Procedure                               Abnormality         Status                     ---------                               -----------         ------                     Manual Differential[[id number]]          Abnormal            Final result               Scan Slide[[id number]]                                                                  CBC Auto Differential[[id number]]        Abnormal            Final result                 Please view results for these tests on the individual orders.    Manual Differential [[id number]]  (Abnormal) Collected:  05/15/18 1854    Specimen:  Blood Updated:  05/15/18 2051     Neutrophil % 56.0 %      Lymphocyte % 34.0 %      Monocyte % 9.0 %      Eosinophil % 1.0 %      Neutrophils Absolute 7.95 10*3/mm3      Lymphocytes Absolute 4.83 (H) 10*3/mm3      Monocytes Absolute 1.28 (H) 10*3/mm3      Eosinophils Absolute 0.14 10*3/mm3      Anisocytosis Slight/1+     WBC Morphology Normal     Platelet Estimate Adequate    Urinalysis, Microscopic Only - Urine, Clean Catch [[id number]]  (Abnormal) Collected:  05/15/18 1925    Specimen:  Urine from Urine, Clean  Catch Updated:  05/15/18 2014     RBC, UA None Seen /HPF      WBC, UA 3-5 /HPF      Bacteria, UA Trace (A) /HPF      Squamous Epithelial Cells, UA 3-5 (A) /HPF      Hyaline Casts, UA 7-12 /LPF      Methodology Manual Light Microscopy    Urine Drug Screen - Urine, Clean Catch [[id number]]  (Abnormal) Collected:  05/15/18 1926    Specimen:  Urine from Urine, Clean Catch Updated:  05/15/18 2000     Amphetamine Screen, Urine Negative     Barbiturates Screen, Urine Negative     Benzodiazepine Screen, Urine Negative     Cocaine Screen, Urine Positive (A)     Methadone Screen, Urine Negative     Opiate Screen Negative     Oxycodone Screen, Urine Negative     THC, Screen, Urine Negative    Narrative:       Negative Thresholds For Drugs Screened in Urine:     Amphetamines          500 ng/ml  Barbiturates          200 ng/ml  Benzodiazepines       200 ng/ml  Cocaine               150 ng/ml  Methadone             300 ng/mL  Opiates               300 ng/mL  Oxycodone             100 ng/mL  THC                   20 ng/mL    The normal value for all drugs tested is negative. This report includes final unconfirmed screening results.  A positive result by this assay can be, at your request, sent to the Reference Lab for confirmation by gas chromatography. Unconfirmed results must not be used for non-medical purposes, such as employment or legal testing. Clinical consideration should be applied to any drug of abuse test result, particularly when unconfirmed results are used.    Urinalysis With / Microscopic If Indicated - Urine, Clean Catch [[id number]]  (Abnormal) Collected:  05/15/18 1925    Specimen:  Urine from Urine, Clean Catch Updated:  05/15/18 1945     Color, UA Dark Yellow     Appearance, UA Turbid (A)     pH, UA 6.0     Specific Gravity, UA 1.034 (H)     Glucose, UA Negative     Ketones, UA Trace (A)     Bilirubin, UA Small (1+) (A)     Blood, UA Negative     Protein,  mg/dL (2+) (A)     Leuk Esterase, UA Trace (A)      Nitrite, UA Negative     Urobilinogen, UA 1.0 E.U./dL    CBC Auto Differential [[id number]]  (Abnormal) Collected:  05/15/18 1854    Specimen:  Blood Updated:  05/15/18 1933     WBC 14.20 (H) 10*3/mm3      RBC 5.04 10*6/mm3      Hemoglobin 17.4 (H) g/dL      Hematocrit 45.9 (H) %      MCV 91.1 fL      MCH 34.5 (H) pg      MCHC 37.9 (H) g/dL      RDW 13.5 %      RDW-SD 44.9 fl      MPV 9.2 fL      Platelets 236 10*3/mm3     Ethanol [[id number]] Collected:  05/15/18 1854    Specimen:  Blood Updated:  05/15/18 1911     Ethanol <10 mg/dL      Ethanol % <0.010 %     Acetaminophen Level [[id number]]  (Abnormal) Collected:  05/15/18 1854    Specimen:  Blood Updated:  05/15/18 1911     Acetaminophen <10.0 (L) mcg/mL     Lipase [[id number]]  (Normal) Collected:  05/15/18 1854    Specimen:  Blood Updated:  05/15/18 1911     Lipase 75 U/L     Comprehensive Metabolic Panel [[id number]]  (Abnormal) Collected:  05/15/18 1854    Specimen:  Blood Updated:  05/15/18 1911     Glucose 154 (H) mg/dL      BUN 11 mg/dL      Creatinine 0.81 mg/dL      Sodium 135 (L) mmol/L      Potassium 3.6 mmol/L      Chloride 101 mmol/L      CO2 21.0 (L) mmol/L      Calcium 9.0 mg/dL      Total Protein 7.9 g/dL      Albumin 4.00 g/dL      ALT (SGPT) 37 U/L      AST (SGOT) 20 U/L      Alkaline Phosphatase 81 U/L      Total Bilirubin 0.7 mg/dL      eGFR Non African Amer 83 mL/min/1.73      Globulin 3.9 (H) gm/dL      A/G Ratio 1.0 (L) g/dL      BUN/Creatinine Ratio 13.6     Anion Gap 13.0 mmol/L     Salicylate Level [[id number]]  (Abnormal) Collected:  05/15/18 1854    Specimen:  Blood Updated:  05/15/18 1911     Salicylate <1.0 (L) mg/dL     CK [[id number]]  (Normal) Collected:  05/15/18 1854    Specimen:  Blood Updated:  05/15/18 1911     Creatine Kinase 43 U/L     hCG, Serum, Qualitative [[id number]]  (Normal) Collected:  05/15/18 1854    Specimen:  Blood Updated:  05/15/18 1909     HCG Qualitative Negative        Imaging Results (all)     Procedure Component  Value Units Date/Time    XR Chest 1 View [[id number]] Collected:  05/15/18 1900     Updated:  05/15/18 1924    Narrative:         EXAM:         Radiograph(s), Chest   VIEWS:   Portable ; 1       DATE/TIME:  5/15/2018 7:22 PM CDT                INDICATION:   Tachy, OD    COMPARISON:  CXR: 4/1/17             FINDINGS:             - lines/tubes:    none     - cardiac:         size within normal limits         - mediastinum: contour within normal limits         - lungs:         no focal air space process, pulmonary  interstitial edema, nodule(s)/mass             - pleura:         no evidence of  fluid                  - osseous:         unremarkable for age                  - misc.:         Impression:       CONCLUSION:        1. No evidence of an active cardiopulmonary process.                                                Electronically signed by:  RANDAL Chang MD  5/15/2018 7:23  PM CDT Workstation: [id number]            Chief Complaint on Day of Discharge: none    Hospital Course:    28-year-old  female with past medical history bipolar disorder and benign intracranial hypertension who presented on 5/15/2018 related to a suicide attempt from drug overdose.  The patient was noted to be lethargic on admission and admitted to taking approximately 20 Seroquel and an unknown amount of Robaxin in an attempt to kill herself.  She also recently bought a handgun and had made mention to family members that she had considered shooting herself as well.    The patient was placed on medical unit overnight and provided with continuous cardiac monitoring and IV hydration.  The patient had no complications during her stay, and was ultimately cleared medically for psychiatry evaluation.  Patient was evaluated by psychiatry and recommendation was for inpatient admission on behavioral health unit.  Patient will be discharged to behavioral health today under the care of Dr. Howe.  Condition on Discharge:   "stable    Physical Exam on Discharge:  /57 (BP Location: Left arm, Patient Position: Lying)   Pulse 83   Temp 97.6 °F (36.4 °C) (Oral)   Resp 16   Ht 172.7 cm (68\")   Wt (!) 141 kg (310 lb 12.8 oz)   SpO2 96%   BMI 47.26 kg/m²   Physical Exam   Constitutional: She is oriented to person, place, and time. She appears well-developed and well-nourished.   HENT:   Head: Normocephalic.   Eyes: Conjunctivae are normal.   Neck: Neck supple.   Cardiovascular: Normal rate, normal heart sounds and intact distal pulses.    Pulmonary/Chest: Effort normal and breath sounds normal.   Abdominal: Soft. Bowel sounds are normal.   Musculoskeletal: Normal range of motion.   Neurological: She is alert and oriented to person, place, and time.   Skin: Skin is warm and dry.   Psychiatric: She exhibits a depressed mood. She expresses no suicidal ideation.   Previous suicide attempt/ideation.  Currently denies being suicidal   Vitals reviewed.        Discharge Disposition:  Psychiatric Hospital or Unit ([facility])    Discharge Medications:   Cyrus Saxena   Home Medication Instructions GRAEME:[id number]    Printed on:05/16/18 7671   Medication Information                      ibuprofen (ADVIL,MOTRIN) 800 MG tablet  Take 1 tablet by mouth Every 6 (Six) Hours As Needed for Mild Pain  or Moderate Pain .             lamoTRIgine (LAMICTAL) 25 MG tablet  Take 1 tablet by mouth Daily.             nicotine (NICODERM CQ) 21 MG/24HR patch  Place 1 patch on the skin Daily.             ondansetron (ZOFRAN) 4 MG tablet  Take 4 mg by mouth Every 8 (Eight) Hours As Needed.             QUEtiapine (SEROQUEL) 50 MG tablet  Take 1 tablet by mouth Every Night.                 Discharge Diet:   Diet Instructions     Diet: Cardiac; Thin       Discharge Diet:  Cardiac    Fluid Consistency:  Thin          Activity at Discharge:   Activity Instructions     Activity as Tolerated             Discharge Care Plan/Instructions: Patient " discharged to behavioral health unit under the care of Dr. Howe.    Follow-up Appointments:   No future appointments.    Test Results Pending at Discharge:           This document has been electronically signed by MARICEL Quinn on May 16, 2018 4:19 PM

## 2018-05-16 NOTE — ED NOTES
Assisted patient up to the bedside commode; voided without difficulty.  Changed into yellow gown, personal belongings sent with security.     Kaylene Hensley RN  05/15/18 1927

## 2018-05-16 NOTE — CONSULTS
Adult Nutrition  Assessment    Patient Name:  Cyrus Saxena  YOB: 1990  MRN: [id number]  Admit Date:  5/15/2018    Assessment Date:  5/16/2018    Comments:  Pt is a 28 year old female admitted with overdose/suicide attempt.  BMI is 47.3 compatible w/morbid obesity at 222% of IBW 140lb.  MD in room with pt at time of RD visit.  Education not appropriate at this time due to mental state of anxiety/depression.  RD will revisit and provide wt loss education if becomes appropriate.          Adult Nutrition Assessment     Row Name 05/16/18 1444       Physical Findings    Overall Physical Appearance obese       Nutrition Prescription PO    Current PO Diet Regular       PO Evaluation    Number of Days PO Intake Evaluated 1 day    Number of Meals 3    % PO Intake 100 x 2, 0 x 1    Row Name 05/16/18 1443       Reason for Assessment    Reason For Assessment identified at risk by screening criteria;per organizational policy    Identified At Risk by Screening Criteria BMI       Nutrition/Diet History    Typical Food/Fluid Intake Pt unavailable at time of RD visit.  MD in room speaking w/pt.       Body Mass Index (BMI)    BMI Assessment BMI 40 or greater: obesity grade III       Labs/Procedures/Meds    Lab Results Reviewed reviewed, pertinent          Electronically signed by:  Kristin Maria RD  05/16/18 2:44 PM

## 2018-05-16 NOTE — PLAN OF CARE
Problem: Patient Care Overview  Goal: Plan of Care Review  Outcome: Ongoing (interventions implemented as appropriate)   05/16/18 6743   Coping/Psychosocial   Plan of Care Reviewed With patient   Plan of Care Review   Progress no change   OTHER   Outcome Summary Patient anxiouxs over what is going to happen to her once she is out of hospital. Patient okayed to use cordless phone to call police dept per her request. Sitter at bedside during call. Officer to come and speak with patient. [facility] with officer.
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
## 2018-05-16 NOTE — NURSING NOTE
Dr Richards ROS         General  Good general health lately    Eyes   glasses/contact lens    ENT/Mouth   None    Cardio   None    Resp   None    GI    None       None    MS    None    Skin/Hair/Nails   None    Neuro   None

## 2018-05-17 PROBLEM — T14.91XA SUICIDE ATTEMPT (HCC): Status: ACTIVE | Noted: 2018-05-17

## 2018-05-17 PROBLEM — F60.3 BORDERLINE PERSONALITY DISORDER (HCC): Status: ACTIVE | Noted: 2018-05-17

## 2018-05-17 PROBLEM — T14.91XA SUICIDAL BEHAVIOR WITH ATTEMPTED SELF-INJURY (HCC): Status: RESOLVED | Noted: 2018-05-15 | Resolved: 2018-05-17

## 2018-05-17 PROBLEM — F39 MOOD DISORDER (HCC): Status: ACTIVE | Noted: 2018-05-17

## 2018-05-17 LAB
ARTICHOKE IGE QN: 62 MG/DL (ref 1–129)
CHOLEST SERPL-MCNC: 103 MG/DL (ref 0–199)
GLUCOSE P FAST SERPL-MCNC: 85 MG/DL (ref 60–110)
HDLC SERPL-MCNC: 23 MG/DL (ref 60–200)
LDLC/HDLC SERPL: 2.6 {RATIO} (ref 0–3.22)
TRIGL SERPL-MCNC: 101 MG/DL (ref 20–199)

## 2018-05-17 PROCEDURE — 90791 PSYCH DIAGNOSTIC EVALUATION: CPT | Performed by: PSYCHIATRY & NEUROLOGY

## 2018-05-17 PROCEDURE — 82947 ASSAY GLUCOSE BLOOD QUANT: CPT | Performed by: PSYCHIATRY & NEUROLOGY

## 2018-05-17 PROCEDURE — 80061 LIPID PANEL: CPT | Performed by: PSYCHIATRY & NEUROLOGY

## 2018-05-17 PROCEDURE — 99222 1ST HOSP IP/OBS MODERATE 55: CPT | Performed by: FAMILY MEDICINE

## 2018-05-17 RX ORDER — ARIPIPRAZOLE 5 MG/1
5 TABLET ORAL DAILY
Status: DISCONTINUED | OUTPATIENT
Start: 2018-05-18 | End: 2018-05-18 | Stop reason: HOSPADM

## 2018-05-17 RX ORDER — ARIPIPRAZOLE 2 MG/1
2 TABLET ORAL DAILY
Status: COMPLETED | OUTPATIENT
Start: 2018-05-17 | End: 2018-05-17

## 2018-05-17 RX ADMIN — HYDROXYZINE PAMOATE 50 MG: 50 CAPSULE ORAL at 16:49

## 2018-05-17 RX ADMIN — ARIPIPRAZOLE 2 MG: 2 TABLET ORAL at 12:28

## 2018-05-17 RX ADMIN — NICOTINE 1 PATCH: 21 PATCH, EXTENDED RELEASE TRANSDERMAL at 08:14

## 2018-05-17 RX ADMIN — ACETAMINOPHEN 650 MG: 325 TABLET ORAL at 19:53

## 2018-05-17 RX ADMIN — TRAZODONE HYDROCHLORIDE 50 MG: 50 TABLET, FILM COATED ORAL at 20:29

## 2018-05-17 NOTE — PLAN OF CARE
Problem: Overarching Goals (Adult)  Goal: Adheres to Safety Considerations for Self and Others  Outcome: Ongoing (interventions implemented as appropriate)    Goal: Optimized Coping Skills in Response to Life Stressors  Outcome: Ongoing (interventions implemented as appropriate)    Goal: Develops/Participates in Therapeutic [place] to Support Successful Transition  Outcome: Ongoing (interventions implemented as appropriate)

## 2018-05-17 NOTE — NURSING NOTE
Behavior   Anxiety: Feeling anxious  Depression: feelings of worthlessness/guilt, suicidal attempt and anxiety  Pain  0  AVH   no  S/I   no  H/I   no    Affect   depressed and anxious    Note:  Patient has been up and out of her room today.  She has had periods of being depressed and tearful but has attempted to maintain a positive attitude.  She did become upset and had increased anxiety after finding out that  had been to her kids school.  She stated she would like to vent and did talk to signee for a bit.  She does have good eye contact and denies SI and HI.        Intervention  Instructed in medication usage and effects  Medications administered as ordered  Encouraged to verbalize needs      Response  Verbalized understanding   Did patient take medications as ordered yes   Did patient interact with assessment?  yes     Plan  Will monitor for safety  Will monitor every 15 minutes as ordered  Will evaluate and promote the plan of care  Will provide safe, calm, quiet environment and promote open communication with staff.
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
## 2018-05-17 NOTE — CONSULTS
Adult Nutrition  Assessment    Patient Name:  Cyrus Saxena  YOB: 1990  MRN: [id number]  Admit Date:  5/16/2018    Assessment Date:  5/17/2018    Comments:  BMI 47 with pt at 229% IBW.  Making Lifestyle Choices for a Healthier Weight used to provide ed regarding Wt Loss Diet and diet copy given.          Adult Nutrition Assessment     Row Name 05/17/18 1705       Reason for Assessment    Reason For Assessment per organizational policy   Wt Loss diet    Diagnosis other (see comments)   dx Obesity    Identified At Risk by Screening Criteria BMI;need for education       Calculation Measurements    Weight Used For Calculations 81.2 kg (179 lb 0.2 oz)       Estimated/Assessed Needs    Additional Documentation Calorie Requirements (Group);Fluid Requirements (Group);[place]-St. Jeor Equation (Group);Protein Requirements (Group)       Calorie Requirements    Estimated Calorie Requirement (kcal/day) 2054    Estimated Calorie Need Method [place]-St Jeor       KCAL/KG    14 Kcal/Kg (kcal) 1136.8    15 Kcal/Kg (kcal) 1218    18 Kcal/Kg (kcal) 1461.6    20 Kcal/Kg (kcal) 1624    25 Kcal/Kg (kcal) 2030    30 Kcal/Kg (kcal) 2436    35 Kcal/Kg (kcal) 2842    40 Kcal/Kg (kcal) 3248    45 Kcal/Kg (kcal) 3654    50 Kcal/Kg (kcal) 4060       [place]-St. Jeor Equation    RMR ([place]-St. Jeor Equation) 1580.5       Protein Requirements    Est Protein Requirement Amount (gms/kg) 1.2 gm protein    Estimated Protein Requirements (gms/day) 97.44       Fluid Requirements    Estimated Fluid Requirements (mL/day) 2437    RDA Method (mL) 2437    Mary-[place] Method (over 20 kg) 3124          Electronically signed by:  Cleo Duarte RD  05/17/18 5:50 PM

## 2018-05-17 NOTE — PLAN OF CARE
Problem: Patient Care Overview  Goal: Discharge Needs Assessment  Outcome: Ongoing (interventions implemented as appropriate)    Goal: Interprofessional Rounds/Family Conf  Outcome: Ongoing (interventions implemented as appropriate)      Problem: Overarching Goals (Adult)  Goal: Adheres to Safety Considerations for Self and Others  Outcome: Ongoing (interventions implemented as appropriate)    Goal: Optimized Coping Skills in Response to Life Stressors  Outcome: Ongoing (interventions implemented as appropriate)    Goal: Develops/Participates in Therapeutic [place] to Support Successful Transition  Outcome: Ongoing (interventions implemented as appropriate)      Problem: Suicidal Behavior (Adult)  Goal: Suicidal Behavior is Absent/Minimized/Managed  Outcome: Ongoing (interventions implemented as appropriate)      Problem: Mood Impairment (Depressive Signs/Symptoms) (Adult)  Goal: Improved Mood Symptoms (Depressive Signs/Symptoms)  Outcome: Ongoing (interventions implemented as appropriate)
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
## 2018-05-17 NOTE — PLAN OF CARE
Problem: Patient Care Overview  Goal: Individualization and Mutuality  Outcome: Ongoing (interventions implemented as appropriate)    Goal: Discharge Needs Assessment  Outcome: Ongoing (interventions implemented as appropriate)    Goal: Interprofessional Rounds/Family Conf  Outcome: Ongoing (interventions implemented as appropriate)      Problem: Overarching Goals (Adult)  Goal: Adheres to Safety Considerations for Self and Others  Outcome: Ongoing (interventions implemented as appropriate)    Goal: Optimized Coping Skills in Response to Life Stressors  Outcome: Ongoing (interventions implemented as appropriate)    Goal: Develops/Participates in Therapeutic [place] to Support Successful Transition  Outcome: Ongoing (interventions implemented as appropriate)      Problem: Suicidal Behavior (Adult)  Goal: Suicidal Behavior is Absent/Minimized/Managed  Outcome: Ongoing (interventions implemented as appropriate)      Problem: Mood Impairment (Depressive Signs/Symptoms) (Adult)  Goal: Improved Mood Symptoms (Depressive Signs/Symptoms)  Outcome: Ongoing (interventions implemented as appropriate)
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
## 2018-05-17 NOTE — CONSULTS
"  CHIEF COMPLAINT/REASON FOR VISIT:  Suicide Attempt    HPI:  Patient presented to our ED with the above complaint on May 15 at around 7:30 PM.  sHe was somewhat \"altered\" as described in the emergency room and she had been brought to the police department by family because of suicidal thoughts and purchasing an unregistered gun within the last week.  In the emergency room she admitted to taking Seroquel tablets uncertain dose about 20 and an unknown number of Robaxin.  She also reported that she had used cocaine the night prior to presentation.  Once on the behavioral health unit she reported very long history of bipolar disease and recurrent depression and slowly worsening suicidal ideation.    She was initially admitted to the hospitalist service, and  received IV hydration and continuous cardiac monitoring.  Once felt to be medically stable she was seen in consultation by psychiatry and transferred to the behavioral health unit.    PROBLEM LIST:  Patient Active Problem List    Diagnosis   • Suicidal behavior with attempted self-injury [T14.91XA]   • Bipolar affective disorder, current episode hypomanic [F31.0]   • Intracranial hypertension, benign [G93.2]   • Insomnia [G47.00]         CURRENT MEDICATIONS:  Prescriptions Prior to Admission   Medication Sig Dispense Refill Last Dose   • ibuprofen (ADVIL,MOTRIN) 800 MG tablet Take 1 tablet by mouth Every 6 (Six) Hours As Needed for Mild Pain  or Moderate Pain . 120 tablet 2 Unknown at Unknown time   • lamoTRIgine (LAMICTAL) 25 MG tablet Take 1 tablet by mouth Daily. 30 tablet 2 Unknown at Unknown time   • nicotine (NICODERM CQ) 21 MG/24HR patch Place 1 patch on the skin Daily.   Unknown at Unknown time   • ondansetron (ZOFRAN) 4 MG tablet Take 4 mg by mouth Every 8 (Eight) Hours As Needed.   Unknown at Unknown time   • QUEtiapine (SEROQUEL) 50 MG tablet Take 1 tablet by mouth Every Night. 30 tablet 2 Unknown at Unknown time       ALLERGIES:  Patient has no known " allergies.      PAST MEDICAL/SURGICAL HISTORY:  Past Medical History:   Diagnosis Date   • Encounter for screening for malignant neoplasm of cervix    • Epidermoid cyst    • Headache    • Headache    • Heartburn    • Heartburn    • Idiopathic intracranial hypertension     unable to assess optic nerve function without vf   • Insomnia    • Insomnia    • Intracranial hypertension, benign    • Papilledema    • Papilledema associated with increased intracranial pressure        Past Surgical History:   Procedure Laterality Date   • CERVICAL CONIZATION, LEEP      LEEP CONE   •  SECTION      x2   • CHOLECYSTECTOMY     • HYSTERECTOMY     • HYSTEROSCOPY  2013    Exam under anesthesia, diagnostic hysteroscopy, fractional dilation and curettage, and NovaSure endometrial ablation   • INJECTION OF MEDICATION  2016    Benadryl (1)   • INJECTION OF MEDICATION  2016    Kenalog (1)   • INJECTION OF MEDICATION  2016    Toradol (1)   • TONSILLECTOMY     • TONSILLECTOMY     • TUBAL ABDOMINAL LIGATION         Review of Systems   Constitutional: Negative for activity change, appetite change, fatigue and fever.   HENT: Negative for congestion, ear discharge, ear pain, facial swelling, hearing loss, nosebleeds, postnasal drip, rhinorrhea, sinus pressure, sore throat, tinnitus and trouble swallowing.    Eyes: Negative for pain, discharge and visual disturbance.   Respiratory: Negative for cough, shortness of breath and wheezing.    Cardiovascular: Negative for chest pain, palpitations and leg swelling.   Gastrointestinal: Negative for abdominal pain, blood in stool, constipation, diarrhea, nausea and vomiting.   Genitourinary: Negative for difficulty urinating, dyspareunia, dysuria, flank pain, frequency, hematuria, menstrual problem, pelvic pain, urgency, vaginal bleeding and vaginal discharge.   Musculoskeletal: Negative for arthralgias, back pain, joint swelling, myalgias and neck pain.   Skin: Negative for  "rash and wound.   Neurological: Negative for dizziness, seizures, syncope, weakness, light-headedness and headaches.   Hematological: Negative for adenopathy.       Social History     Social History   • Marital status:      Spouse name: N/A   • Number of children: N/A   • Years of education: N/A     Occupational History   • Not on file.     Social History Main Topics   • Smoking status: Current Every Day Smoker     Packs/day: 0.50     Types: Cigarettes   • Smokeless tobacco: Never Used   • Alcohol use No   • Drug use: Yes     Types: Cocaine   • Sexual activity: Defer     Other Topics Concern   • Not on file     Social History Narrative    Substance Abuse: Alcohol: occasional/rare use,  Cannabis: occasional/rare use and Cocaine: first-time Thursday, cigarettes        Marriages: 3    Current Relationships: Single    Children: 2        Education: some college     Occupation: previously worked at [employer], quit 1 week ago    Living Situation: in her home with children next door to her mom.       Family History   Problem Relation Age of Onset   • Cervical cancer Mother    • Hypertension Maternal Grandmother    • Osteoarthritis Maternal Grandmother    • Cancer Other         colorectal cancer   • Ovarian cancer Other              Objective     /81 (BP Location: Left arm, Patient Position: Sitting)   Pulse 94   Temp 98.2 °F (36.8 °C) (Tympanic)   Resp 18   Ht 172.7 cm (68\")   Wt (!) 141 kg (310 lb 14.4 oz)   SpO2 97%   BMI 47.27 kg/m²     Physical Exam   Constitutional: She appears well-developed and well-nourished.   HENT:   Head: Normocephalic and atraumatic.   Eyes: Conjunctivae and EOM are normal.   Neck: Normal range of motion. Neck supple. No thyromegaly present.   Cardiovascular: Normal rate, regular rhythm and normal heart sounds.  Exam reveals no gallop and no friction rub.    No murmur heard.  Pulmonary/Chest: Effort normal and breath sounds normal. No respiratory distress. She has no wheezes. " She has no rales.   Abdominal: Soft. She exhibits no distension and no mass. There is no tenderness. There is no rebound and no guarding.   Musculoskeletal: Normal range of motion.   Lymphadenopathy:     She has no cervical adenopathy.   Neurological: She is alert. She has normal strength and normal reflexes. She displays no tremor and normal reflexes. No cranial nerve deficit or sensory deficit. She exhibits normal muscle tone. Coordination normal. She displays no Babinski's sign on the right side. She displays no Babinski's sign on the left side.   Reflex Scores:       Tricep reflexes are 2+ on the right side and 2+ on the left side.       Bicep reflexes are 2+ on the right side and 2+ on the left side.       Brachioradialis reflexes are 2+ on the right side and 2+ on the left side.       Patellar reflexes are 2+ on the right side and 2+ on the left side.       Achilles reflexes are 2+ on the right side and 2+ on the left side.  Skin: Skin is warm and dry. No rash noted. No erythema.   Nursing note and vitals reviewed.      Dystonia/Tardive Dyskinesia  Absent  Meningeal Signs  Absent    Diagnostic Studies  Initial results on May 15 shows ethanol less than 10, acetaminophen less than 10, and urine drug screen positive only for cocaine.  BMP was normal, CBC normal except for white count of 11,400 and urinalysis on May 16 is negative.  Serum pregnancy test negative, and lipase normal at 75.  CK normal at 43.  Initial glucose high at 154 but normal twice after that at 93 and 85.    EKG done on May 15 showed:  Vent. Rate : 136 BPM     Atrial Rate : 136 BPM     P-R Int : 138 ms          QRS Dur : 070 ms      QT Int : 290 ms       P-R-T Axes : 038 106 026 degrees     QTc Int : 436 ms    Sinus tachycardia  Possible Left atrial enlargement  Rightward axis  Borderline ECG    Assessment/Plan     Patient Active Problem List    Diagnosis   • Suicidal behavior with attempted self-injury [T14.91XA]   • Bipolar affective  disorder, current episode hypomanic [F31.0]   • Intracranial hypertension, benign [G93.2]   • Insomnia [G47.00]           Continue Home Meds as ordered. Mental health and pain issues managed per psychiatry.  Further diagnostic studies or intervention based on hospital course.

## 2018-05-18 VITALS
WEIGHT: 293 LBS | SYSTOLIC BLOOD PRESSURE: 129 MMHG | OXYGEN SATURATION: 100 % | DIASTOLIC BLOOD PRESSURE: 62 MMHG | HEIGHT: 68 IN | TEMPERATURE: 97.1 F | RESPIRATION RATE: 18 BRPM | BODY MASS INDEX: 44.41 KG/M2 | HEART RATE: 83 BPM

## 2018-05-18 RX ORDER — ARIPIPRAZOLE 5 MG/1
5 TABLET ORAL DAILY
Qty: 30 TABLET | Refills: 0 | Status: SHIPPED | OUTPATIENT
Start: 2018-05-19

## 2018-05-18 RX ADMIN — HYDROXYZINE PAMOATE 50 MG: 50 CAPSULE ORAL at 12:40

## 2018-05-18 RX ADMIN — ARIPIPRAZOLE 5 MG: 5 TABLET ORAL at 09:04

## 2018-05-18 RX ADMIN — NICOTINE 1 PATCH: 21 PATCH, EXTENDED RELEASE TRANSDERMAL at 09:04

## 2018-05-18 NOTE — NURSING NOTE
Patient asked for pain medication because of pain in the upper left inner arm. Patient given tylenol. Nurse assessed area.

## 2018-05-18 NOTE — SIGNIFICANT NOTE
05/18/18 1241   Individual Counseling   Length of Session 10   Topic safety plan   Patient Response Pt presented in interview room dressed appropriately, affect was bright, mood was calm, pt. seems somewhat anxious as she is trying to get a hold of CPS by they are on lunch break.  CSW has scheduled pt. with [facility] for therapy and meds, also with her PCP . Furthermore CSW educated pt. on Crisis Hotline and encourage her to take meds on time and to identify triggers that makes her impulsive. Moreover pt. has plans to move to [place] to a friend's house soon in next couple of weeks. Pt. denies SI/Hi, AVH. CSW educated pt. on Crisis Hotline. BPRS was completed upon dc. Pt. participated in group and individual therapy actively. Her mom is  coming to pick her up.

## 2018-05-18 NOTE — DISCHARGE SUMMARY
5/18/2018    Source of History:  chart review and the patient    Sub    Patient is a 28 y.o. female who presents with suicide attempt. Onset of symptoms was gradual starting a few months ago.  Symptoms have been present on an intermittent basis. Symptoms are associated with depressed mood and anger and impulsivity.  Symptoms are aggravated by problems with her mother.   Patient's symptoms occur in the context of relationship instability.      States today that she is feeling much better and has a plan to improve her situation and remove the triggers to her thoughts of self harm.  Has denied si since admission.  Has been med compliant and participating in groups.  Reports insight into factors that led to admission.  Sleeping well.      Mental Status Exam:   Cooperation:  Cooperative  Eye Contact:  Good  Psychomotor Behavior:  Appropriate  Mood: Euthymic  Affect:  normal  Speech:  Normal  Thought Process:  Coherent  Associations: Goal Directed  Thought Content:     Normal   Suicidal:  Denies   Homicidal:  None   Hallucinations:  None   Delusion:  None  Cognitive Functioning:   Consciousness: awake and alert   Orientation:  Person, Place, Time and Situation   Attention: normal Concentration: Normal   Language:  Intact Vocabulary: Average   Short Term Memory: Intact   Long Term Memory: Intact   Fund of Knowledge: Average  Reliability:  fair  Insight:  Fair  Judgement:  Fair  Impulse Control:  Impaired    Diagnostic Data:    Recent Results (from the past 72 hour(s))   Comprehensive Metabolic Panel    Collection Time: 05/15/18  6:54 PM   Result Value Ref Range    Glucose 154 (H) 60 - 100 mg/dL    BUN 11 7 - 21 mg/dL    Creatinine 0.81 0.50 - 1.00 mg/dL    Sodium 135 (L) 137 - 145 mmol/L    Potassium 3.6 3.5 - 5.1 mmol/L    Chloride 101 95 - 110 mmol/L    CO2 21.0 (L) 22.0 - 31.0 mmol/L    Calcium 9.0 8.4 - 10.2 mg/dL    Total Protein 7.9 6.3 - 8.6 g/dL    Albumin 4.00 3.40 - 4.80 g/dL    ALT (SGPT) 37 9 - 52 U/L    AST  (SGOT) 20 14 - 36 U/L    Alkaline Phosphatase 81 38 - 126 U/L    Total Bilirubin 0.7 0.2 - 1.3 mg/dL    eGFR Non  Amer 83 64 - 149 mL/min/1.73    Globulin 3.9 (H) 2.3 - 3.5 gm/dL    A/G Ratio 1.0 (L) 1.1 - 1.8 g/dL    BUN/Creatinine Ratio 13.6 7.0 - 25.0    Anion Gap 13.0 5.0 - 15.0 mmol/L   Lipase    Collection Time: 05/15/18  6:54 PM   Result Value Ref Range    Lipase 75 23 - 300 U/L   hCG, Serum, Qualitative    Collection Time: 05/15/18  6:54 PM   Result Value Ref Range    HCG Qualitative Negative Negative   Acetaminophen Level    Collection Time: 05/15/18  6:54 PM   Result Value Ref Range    Acetaminophen <10.0 (L) 10.0 - 30.0 mcg/mL   Ethanol    Collection Time: 05/15/18  6:54 PM   Result Value Ref Range    Ethanol <10 0 - 10 mg/dL    Ethanol % <0.010 %   Salicylate Level    Collection Time: 05/15/18  6:54 PM   Result Value Ref Range    Salicylate <1.0 (L) 10.0 - 20.0 mg/dL   CK    Collection Time: 05/15/18  6:54 PM   Result Value Ref Range    Creatine Kinase 43 30 - 135 U/L   CBC Auto Differential    Collection Time: 05/15/18  6:54 PM   Result Value Ref Range    WBC 14.20 (H) 3.20 - 9.80 10*3/mm3    RBC 5.04 3.77 - 5.16 10*6/mm3    Hemoglobin 17.4 (H) 12.0 - 15.5 g/dL    Hematocrit 45.9 (H) 35.0 - 45.0 %    MCV 91.1 80.0 - 98.0 fL    MCH 34.5 (H) 26.5 - 34.0 pg    MCHC 37.9 (H) 31.4 - 36.0 g/dL    RDW 13.5 11.5 - 14.5 %    RDW-SD 44.9 36.4 - 46.3 fl    MPV 9.2 8.0 - 12.0 fL    Platelets 236 150 - 450 10*3/mm3   Manual Differential    Collection Time: 05/15/18  6:54 PM   Result Value Ref Range    Neutrophil % 56.0 37.0 - 80.0 %    Lymphocyte % 34.0 10.0 - 50.0 %    Monocyte % 9.0 0.0 - 12.0 %    Eosinophil % 1.0 0.0 - 7.0 %    Neutrophils Absolute 7.95 2.00 - 8.60 10*3/mm3    Lymphocytes Absolute 4.83 (H) 0.60 - 4.20 10*3/mm3    Monocytes Absolute 1.28 (H) 0.00 - 0.90 10*3/mm3    Eosinophils Absolute 0.14 0.00 - 0.70 10*3/mm3    Anisocytosis Slight/1+ None Seen    WBC Morphology Normal Normal    Platelet  Estimate Adequate Normal   Urinalysis With / Microscopic If Indicated - Urine, Clean Catch    Collection Time: 05/15/18  7:25 PM   Result Value Ref Range    Color, UA Dark Yellow Yellow, Straw, Dark Yellow, Raeann    Appearance, UA Turbid (A) Clear    pH, UA 6.0 5.0 - 9.0    Specific Gravity, UA 1.034 (H) 1.003 - 1.030    Glucose, UA Negative Negative    Ketones, UA Trace (A) Negative    Bilirubin, UA Small (1+) (A) Negative    Blood, UA Negative Negative    Protein,  mg/dL (2+) (A) Negative    Leuk Esterase, UA Trace (A) Negative    Nitrite, UA Negative Negative    Urobilinogen, UA 1.0 E.U./dL 0.2 - 1.0 E.U./dL   Urinalysis, Microscopic Only - Urine, Clean Catch    Collection Time: 05/15/18  7:25 PM   Result Value Ref Range    RBC, UA None Seen None Seen /HPF    WBC, UA 3-5 None Seen, 0-2, 3-5 /HPF    Bacteria, UA Trace (A) None Seen /HPF    Squamous Epithelial Cells, UA 3-5 (A) None Seen, 0-2 /HPF    Hyaline Casts, UA 7-12 None Seen /LPF    Methodology Manual Light Microscopy    Urine Drug Screen - Urine, Clean Catch    Collection Time: 05/15/18  7:26 PM   Result Value Ref Range    Amphetamine Screen, Urine Negative Negative    Barbiturates Screen, Urine Negative Negative    Benzodiazepine Screen, Urine Negative Negative    Cocaine Screen, Urine Positive (A) Negative    Methadone Screen, Urine Negative Negative    Opiate Screen Negative Negative    Oxycodone Screen, Urine Negative Negative    THC, Screen, Urine Negative Negative   Urinalysis With / Culture If Indicated - Urine, Clean Catch    Collection Time: 05/16/18  3:59 AM   Result Value Ref Range    Color, UA Yellow Yellow, Straw, Dark Yellow, Raeann    Appearance, UA Clear Clear    pH, UA 6.0 5.0 - 9.0    Specific [place], UA 1.011 1.003 - 1.030    Glucose, UA Negative Negative    Ketones, UA Negative Negative    Bilirubin, UA Negative Negative    Blood, UA Negative Negative    Protein, UA Negative Negative    Leuk Esterase, UA Negative Negative     Nitrite, UA Negative Negative    Urobilinogen, UA 0.2 E.U./dL 0.2 - 1.0 E.U./dL   Basic Metabolic Panel    Collection Time: 05/16/18  5:29 AM   Result Value Ref Range    Glucose 93 60 - 100 mg/dL    BUN 10 7 - 21 mg/dL    Creatinine 0.65 0.50 - 1.00 mg/dL    Sodium 139 137 - 145 mmol/L    Potassium 3.9 3.5 - 5.1 mmol/L    Chloride 108 95 - 110 mmol/L    CO2 25.0 22.0 - 31.0 mmol/L    Calcium 8.5 8.4 - 10.2 mg/dL    eGFR Non  Amer 107 64 - 149 mL/min/1.73    BUN/Creatinine Ratio 15.4 7.0 - 25.0    Anion Gap 6.0 5.0 - 15.0 mmol/L   CBC Auto Differential    Collection Time: 05/16/18  5:29 AM   Result Value Ref Range    WBC 11.41 (H) 3.20 - 9.80 10*3/mm3    RBC 4.23 3.77 - 5.16 10*6/mm3    Hemoglobin 14.2 12.0 - 15.5 g/dL    Hematocrit 38.4 35.0 - 45.0 %    MCV 90.8 80.0 - 98.0 fL    MCH 33.6 26.5 - 34.0 pg    MCHC 37.0 (H) 31.4 - 36.0 g/dL    RDW 13.5 11.5 - 14.5 %    RDW-SD 44.0 36.4 - 46.3 fl    MPV 9.5 8.0 - 12.0 fL    Platelets 211 150 - 450 10*3/mm3    Neutrophil % 58.5 37.0 - 80.0 %    Lymphocyte % 31.3 10.0 - 50.0 %    Monocyte % 8.1 0.0 - 12.0 %    Eosinophil % 1.6 0.0 - 7.0 %    Basophil % 0.2 0.0 - 2.0 %    Immature Grans % 0.3 0.0 - 0.5 %    Neutrophils, Absolute 6.69 2.00 - 8.60 10*3/mm3    Lymphocytes, Absolute 3.57 0.60 - 4.20 10*3/mm3    Monocytes, Absolute 0.92 (H) 0.00 - 0.90 10*3/mm3    Eosinophils, Absolute 0.18 0.00 - 0.70 10*3/mm3    Basophils, Absolute 0.02 0.00 - 0.20 10*3/mm3    Immature Grans, Absolute 0.03 (H) 0.00 - 0.02 10*3/mm3   Glucose, Fasting    Collection Time: 05/17/18  5:29 AM   Result Value Ref Range    Glucose, Fasting 85 60 - 110 mg/dL   Lipid Panel    Collection Time: 05/17/18  5:29 AM   Result Value Ref Range    Total Cholesterol 103 0 - 199 mg/dL    Triglycerides 101 20 - 199 mg/dL    HDL Cholesterol 23 (L) 60 - 200 mg/dL    LDL Cholesterol  62 1 - 129 mg/dL    LDL/HDL Ratio 2.60 0.00 - 3.22     Xr Chest 1 View    Result Date: 5/15/2018  Narrative: EXAM:          Radiograph(s), Chest VIEWS:   Portable ; 1     DATE/TIME:  5/15/2018 7:22 PM CDT            INDICATION:   Tachy, OD  COMPARISON:  CXR: 4/1/17         FINDINGS:         - lines/tubes:    none   - cardiac:         size within normal limits       - mediastinum: contour within normal limits       - lungs:         no focal air space process, pulmonary interstitial edema, nodule(s)/mass           - pleura:         no evidence of  fluid                - osseous:         unremarkable for age                - misc.:        Impression: CONCLUSION:    1. No evidence of an active cardiopulmonary process.                                  Electronically signed by:  RANDAL Chang MD  5/15/2018 7:23 PM CDT Workstation: [id number]        Patient Strengths: communication skills, patient is voluntary, is willing to work on problems     Patient Barriers: lack of social/family support, low self esteem    Assessment/Plan     Principal Problem:    Borderline personality disorder  Active Problems:    Suicide attempt    Mood disorder      Treatment Plan:    1) Will d/c today.  2) Patient will be provided treatment with the unit milieu, activities, therapies and psychopharmacological management.  3) Patient placed on  Q15 minute checks  and Suicide precautions.  4) Dr. Richards consulted for assistance in management of medical co-morbidities.  5) Will order following labs: none  6) Will restart patient on the following psychiatric home meds: Stop the seroquel and lamictal.  7) Will make the following medication changes: Start abilify to 5mg daily.  8) Will begin discharge planning as appropriate for patient.  9) Psychotherapy provided for 16-37 minutes.  Provided CBT and psychoeducation.    Treatment plan and medication risks and benefits discussed with: Patient        Prognosis: fair     Follow up:  Ori and PCP  Discharge Instructions:  Follow up with PCP and continue:     Cyrus Saxena   Home Medication Instructions  [facility]:[id number]    Printed on:06/07/18 0812   Medication Information                      ARIPiprazole (ABILIFY) 5 MG tablet  Take 1 tablet by mouth Daily.             ibuprofen (ADVIL,MOTRIN) 800 MG tablet  Take 1 tablet by mouth Every 6 (Six) Hours As Needed for Mild Pain  or Moderate Pain .             lamoTRIgine (LAMICTAL) 25 MG tablet  Take 1 tablet by mouth Daily.             ondansetron (ZOFRAN) 4 MG tablet  Take 4 mg by mouth Every 8 (Eight) Hours As Needed.                   Shaka Goodwin MD  05/18/18  10:37 AM

## 2018-05-18 NOTE — PLAN OF CARE
Problem: Patient Care Overview  Goal: Plan of Care Review  Outcome: Ongoing (interventions implemented as appropriate)      Problem: Overarching Goals (Adult)  Goal: Adheres to Safety Considerations for Self and Others  Outcome: Ongoing (interventions implemented as appropriate)    Goal: Optimized Coping Skills in Response to Life Stressors  Outcome: Ongoing (interventions implemented as appropriate)    Goal: Develops/Participates in Therapeutic [place] to Support Successful Transition  Outcome: Ongoing (interventions implemented as appropriate)  Participates well in group.    Problem: Suicidal Behavior (Adult)  Goal: Suicidal Behavior is Absent/Minimized/Managed  Outcome: Outcome(s) achieved Date Met: 05/18/18      Problem: Mood Impairment (Depressive Signs/Symptoms) (Adult)  Goal: Improved Mood Symptoms (Depressive Signs/Symptoms)  Outcome: Ongoing (interventions implemented as appropriate)

## 2018-05-18 NOTE — NURSING NOTE
Patient discharged from [facility] for discharge home, pt stable, no s/s of distress noted. All discharge paperwork, medications and follow up appointments reviewed with patient, patient verbalized understanding. Prescription for Abilify called to [facility]. All patient's belongings and items from security returned to patient. Patient denies any questions/concerns.

## 2018-05-18 NOTE — PLAN OF CARE
Problem: Overarching Goals (Adult)  Goal: Adheres to Safety Considerations for Self and Others  Outcome: Ongoing (interventions implemented as appropriate)    Goal: Optimized Coping Skills in Response to Life Stressors  Outcome: Ongoing (interventions implemented as appropriate)    Goal: Develops/Participates in Therapeutic [place] to Support Successful Transition  Outcome: Ongoing (interventions implemented as appropriate)      Problem: Suicidal Behavior (Adult)  Goal: Suicidal Behavior is Absent/Minimized/Managed  Outcome: Ongoing (interventions implemented as appropriate)      Problem: Mood Impairment (Depressive Signs/Symptoms) (Adult)  Goal: Improved Mood Symptoms (Depressive Signs/Symptoms)  Outcome: Ongoing (interventions implemented as appropriate)
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
## 2018-05-18 NOTE — NURSING NOTE
"Behavior   Anxiety: Feeling worried  Depression: denies  Pain  0  AVH   no  S/I   no  H/I   no    Affect   calm and pleasant    Note: Patient interacting appropriately with staff and peers. Patient states she is \"feeling better and ready to go home\". Patient reports she has \"identified my trigger and know what I have to do\". Patient states she has a plan to move from current residence. Patient states,\"I knew right after I tried to kill myself that it was stupid thing to do, I just let my mom drive me crazy, I can't do that\". Patient took medication well and agrees to notify staff of any concerns. Discussed with patient the importance of continuing care on an outpatient basis, pt voices understanding.      Intervention  Instructed in medication usage and effects  Medications administered as ordered  Encouraged to verbalize needs      Response  Verbalized understanding   Did patient take medications as ordered yes   Did patient interact with assessment?  yes     Plan  Will monitor for safety  Will monitor every 15 minutes as ordered  Will evaluate and promote the plan of care    "

## 2023-07-13 NOTE — PROGRESS NOTES
Met with patient to complete recreation Therapy assessment.  Patient states she spends the majority of time with her [email or web address]  She states that she would like to work on setting goals for the future.  Patient will be encouraged to participate in all groups and exhibit understanding of coping skills   n/a